# Patient Record
Sex: MALE | Race: OTHER | Employment: OTHER | URBAN - METROPOLITAN AREA
[De-identification: names, ages, dates, MRNs, and addresses within clinical notes are randomized per-mention and may not be internally consistent; named-entity substitution may affect disease eponyms.]

---

## 2018-10-17 ENCOUNTER — OFFICE VISIT (OUTPATIENT)
Dept: FAMILY MEDICINE CLINIC | Facility: CLINIC | Age: 73
End: 2018-10-17
Payer: MEDICARE

## 2018-10-17 ENCOUNTER — TELEPHONE (OUTPATIENT)
Dept: FAMILY MEDICINE CLINIC | Facility: CLINIC | Age: 73
End: 2018-10-17

## 2018-10-17 VITALS
WEIGHT: 154 LBS | SYSTOLIC BLOOD PRESSURE: 165 MMHG | DIASTOLIC BLOOD PRESSURE: 77 MMHG | HEIGHT: 64.5 IN | HEART RATE: 79 BPM | BODY MASS INDEX: 25.97 KG/M2 | TEMPERATURE: 98 F

## 2018-10-17 DIAGNOSIS — K92.1 BLOOD IN THE STOOL: ICD-10-CM

## 2018-10-17 DIAGNOSIS — E11.9 DIABETES MELLITUS TYPE 2 IN NONOBESE (HCC): Primary | ICD-10-CM

## 2018-10-17 DIAGNOSIS — I25.10 CAD IN NATIVE ARTERY: ICD-10-CM

## 2018-10-17 DIAGNOSIS — I10 ESSENTIAL HYPERTENSION: ICD-10-CM

## 2018-10-17 DIAGNOSIS — D50.9 IRON DEFICIENCY ANEMIA, UNSPECIFIED IRON DEFICIENCY ANEMIA TYPE: ICD-10-CM

## 2018-10-17 PROCEDURE — 99203 OFFICE O/P NEW LOW 30 MIN: CPT | Performed by: FAMILY MEDICINE

## 2018-10-17 PROCEDURE — G0463 HOSPITAL OUTPT CLINIC VISIT: HCPCS | Performed by: FAMILY MEDICINE

## 2018-10-17 RX ORDER — CLOPIDOGREL BISULFATE 75 MG/1
75 TABLET ORAL DAILY
COMMUNITY
End: 2018-10-17

## 2018-10-17 RX ORDER — ATORVASTATIN CALCIUM 40 MG/1
40 TABLET, FILM COATED ORAL NIGHTLY
Qty: 90 TABLET | Refills: 0 | Status: SHIPPED | OUTPATIENT
Start: 2018-10-17 | End: 2018-12-10

## 2018-10-17 RX ORDER — CLOPIDOGREL BISULFATE 75 MG/1
75 TABLET ORAL DAILY
Qty: 90 TABLET | Refills: 0 | Status: SHIPPED | OUTPATIENT
Start: 2018-10-17 | End: 2018-12-10

## 2018-10-17 RX ORDER — METOPROLOL SUCCINATE 50 MG/1
50 TABLET, EXTENDED RELEASE ORAL DAILY
Qty: 90 TABLET | Refills: 1 | Status: SHIPPED | OUTPATIENT
Start: 2018-10-17 | End: 2018-12-10

## 2018-10-17 RX ORDER — ATORVASTATIN CALCIUM 40 MG/1
40 TABLET, FILM COATED ORAL NIGHTLY
COMMUNITY
End: 2018-10-17

## 2018-10-17 RX ORDER — OLMESARTAN MEDOXOMIL AND HYDROCHLOROTHIAZIDE 40/12.5 40; 12.5 MG/1; MG/1
1 TABLET ORAL DAILY
COMMUNITY
End: 2018-10-17

## 2018-10-17 RX ORDER — OLMESARTAN MEDOXOMIL AND HYDROCHLOROTHIAZIDE 40/12.5 40; 12.5 MG/1; MG/1
1 TABLET ORAL DAILY
Qty: 90 TABLET | Refills: 1 | Status: SHIPPED | OUTPATIENT
Start: 2018-10-17 | End: 2019-01-21

## 2018-10-17 RX ORDER — METOPROLOL SUCCINATE 50 MG/1
50 TABLET, EXTENDED RELEASE ORAL DAILY
COMMUNITY
End: 2018-10-17

## 2018-10-19 ENCOUNTER — LAB ENCOUNTER (OUTPATIENT)
Dept: LAB | Age: 73
End: 2018-10-19
Attending: FAMILY MEDICINE
Payer: MEDICARE

## 2018-10-19 DIAGNOSIS — E11.9 DIABETES MELLITUS TYPE 2 IN NONOBESE (HCC): ICD-10-CM

## 2018-10-19 PROCEDURE — 83036 HEMOGLOBIN GLYCOSYLATED A1C: CPT

## 2018-10-19 PROCEDURE — 80061 LIPID PANEL: CPT

## 2018-10-19 PROCEDURE — 80053 COMPREHEN METABOLIC PANEL: CPT

## 2018-10-19 PROCEDURE — 84443 ASSAY THYROID STIM HORMONE: CPT

## 2018-10-19 PROCEDURE — 85025 COMPLETE CBC W/AUTO DIFF WBC: CPT

## 2018-10-19 PROCEDURE — 36415 COLL VENOUS BLD VENIPUNCTURE: CPT

## 2018-10-23 NOTE — PROGRESS NOTES
HPI:    Patient ID: Ama Velasco is a 68year old male. Patient here first time. Has DM, history of stent placement and pacemaker and hypertension. Overall doing well. Review of Systems   Constitutional: Negative.   Negative for activity change, diagnosis)  Cad in native artery  Needs blood test, referral to cardiology. Medications refilled.  Will f/u after the results  Orders Placed This Encounter      Comp Metabolic Panel (14) [E]      Hemoglobin A1C [E]      Lipid Panel [E]      TSH [E]      CBC

## 2018-11-13 ENCOUNTER — NURSE ONLY (OUTPATIENT)
Dept: FAMILY MEDICINE CLINIC | Facility: CLINIC | Age: 73
End: 2018-11-13
Payer: MEDICARE

## 2018-11-13 DIAGNOSIS — Z23 PNEUMOCOCCAL VACCINE ADMINISTERED: Primary | ICD-10-CM

## 2018-11-13 PROCEDURE — G0009 ADMIN PNEUMOCOCCAL VACCINE: HCPCS | Performed by: FAMILY MEDICINE

## 2018-11-13 PROCEDURE — 90732 PPSV23 VACC 2 YRS+ SUBQ/IM: CPT | Performed by: FAMILY MEDICINE

## 2018-12-07 ENCOUNTER — TELEPHONE (OUTPATIENT)
Dept: FAMILY MEDICINE CLINIC | Facility: CLINIC | Age: 73
End: 2018-12-07

## 2018-12-07 NOTE — TELEPHONE ENCOUNTER
See message for details from Dr. Sarina Finnegan. .  Pt call message left    Patient to repeat test in 3 months ( ordered) and f/u.  Until then more exercise and diet

## 2018-12-10 ENCOUNTER — OFFICE VISIT (OUTPATIENT)
Dept: CARDIOLOGY CLINIC | Facility: CLINIC | Age: 73
End: 2018-12-10
Payer: MEDICARE

## 2018-12-10 ENCOUNTER — TELEPHONE (OUTPATIENT)
Dept: CARDIOLOGY CLINIC | Facility: CLINIC | Age: 73
End: 2018-12-10

## 2018-12-10 VITALS
HEIGHT: 66 IN | SYSTOLIC BLOOD PRESSURE: 146 MMHG | WEIGHT: 155 LBS | RESPIRATION RATE: 16 BRPM | BODY MASS INDEX: 24.91 KG/M2 | DIASTOLIC BLOOD PRESSURE: 82 MMHG | HEART RATE: 80 BPM

## 2018-12-10 DIAGNOSIS — I25.10 CORONARY ARTERY DISEASE INVOLVING NATIVE CORONARY ARTERY OF NATIVE HEART WITHOUT ANGINA PECTORIS: Primary | ICD-10-CM

## 2018-12-10 DIAGNOSIS — E78.00 PURE HYPERCHOLESTEROLEMIA: ICD-10-CM

## 2018-12-10 PROCEDURE — G0463 HOSPITAL OUTPT CLINIC VISIT: HCPCS | Performed by: INTERNAL MEDICINE

## 2018-12-10 PROCEDURE — 99204 OFFICE O/P NEW MOD 45 MIN: CPT | Performed by: INTERNAL MEDICINE

## 2018-12-10 PROCEDURE — 93010 ELECTROCARDIOGRAM REPORT: CPT | Performed by: INTERNAL MEDICINE

## 2018-12-10 PROCEDURE — 93005 ELECTROCARDIOGRAM TRACING: CPT | Performed by: INTERNAL MEDICINE

## 2018-12-10 RX ORDER — ATORVASTATIN CALCIUM 40 MG/1
40 TABLET, FILM COATED ORAL NIGHTLY
Qty: 90 TABLET | Refills: 3 | Status: CANCELLED | OUTPATIENT
Start: 2018-12-10

## 2018-12-10 RX ORDER — METOPROLOL SUCCINATE 50 MG/1
50 TABLET, EXTENDED RELEASE ORAL DAILY
Qty: 90 TABLET | Refills: 1 | Status: SHIPPED | OUTPATIENT
Start: 2018-12-10 | End: 2019-01-21

## 2018-12-10 RX ORDER — CLOPIDOGREL BISULFATE 75 MG/1
75 TABLET ORAL DAILY
Qty: 90 TABLET | Refills: 3 | Status: SHIPPED | OUTPATIENT
Start: 2018-12-10 | End: 2019-09-23

## 2018-12-10 RX ORDER — ATORVASTATIN CALCIUM 40 MG/1
40 TABLET, FILM COATED ORAL NIGHTLY
Qty: 90 TABLET | Refills: 0 | Status: SHIPPED | OUTPATIENT
Start: 2018-12-10 | End: 2019-09-23

## 2018-12-10 RX ORDER — METOPROLOL SUCCINATE 50 MG/1
50 TABLET, EXTENDED RELEASE ORAL DAILY
Qty: 90 TABLET | Refills: 3 | Status: CANCELLED | OUTPATIENT
Start: 2018-12-10

## 2018-12-10 NOTE — PATIENT INSTRUCTIONS
Schedule an appointment with our pacemaker nurse, continue current medications, see me back in 6 months or as needed

## 2018-12-10 NOTE — TELEPHONE ENCOUNTER
Re: metoprolol and Atorvastatin LOV reviewed. No change in this medication. Meets refill protocol criteria. Refill sent.   Cholesterol Medications  Protocol Criteria:  · Appointment scheduled with Cardiology in the past 12 months or in the next 3 months  · Department Appt Notes    In 1 week [facility] Cardiology         Lab Results   Component Value Date     (H) 10/19/2018    BUN 29 (H) 10/19/2018    CREATSERUM 1.28 10/19/2018    BUNCREA 22.7 (H) 10/19/2018    GFR

## 2018-12-10 NOTE — PROGRESS NOTES
Marcy Mathur is a 68year old male. HPI:   This is a pleasant 51-year-old man who in 2014 had chest discomfort and had a stent placed at [address] year when visiting in [facility] he had an episode of syncope.   He was taken to a hospital there we rhythm, normal heart sounds and intact distal pulses. Pulmonary/Chest: Effort normal and breath sounds normal.   Abdominal: Soft. Bowel sounds are normal.   Neurological: He is alert and oriented to person, place, and time. He has normal reflexes.    Skin

## 2018-12-20 ENCOUNTER — NURSE ONLY (OUTPATIENT)
Dept: CARDIOLOGY CLINIC | Facility: CLINIC | Age: 73
End: 2018-12-20
Payer: MEDICARE

## 2018-12-20 PROCEDURE — 93280 PM DEVICE PROGR EVAL DUAL: CPT | Performed by: INTERNAL MEDICINE

## 2018-12-20 NOTE — PROGRESS NOTES
Pt. Referred to Pacemaker clinic by Dr. Virgene Hashimoto- saw recently. Pt. Had MI while vacationing in [facility] 11/2017 and got a pacemaker after cardiac stent placed. Interrogation shows [employer] pacemaker implanted 11/17/2017.     Native rhythm is SR    D

## 2019-01-14 ENCOUNTER — TELEPHONE (OUTPATIENT)
Dept: FAMILY MEDICINE CLINIC | Facility: CLINIC | Age: 74
End: 2019-01-14

## 2019-01-14 NOTE — TELEPHONE ENCOUNTER
Patient is going out of town this weeks and he would like a refill on medication. Please call patient when refill is done or with any other questions. •  Metoprolol Succinate ER 50 MG Oral Tablet 24 Hr, Take 1 tablet (50 mg total) by mouth daily. , Disp:

## 2019-01-21 RX ORDER — OLMESARTAN MEDOXOMIL AND HYDROCHLOROTHIAZIDE 40/12.5 40; 12.5 MG/1; MG/1
1 TABLET ORAL DAILY
Qty: 90 TABLET | Refills: 0 | Status: SHIPPED | OUTPATIENT
Start: 2019-01-21 | End: 2019-09-24

## 2019-01-21 RX ORDER — METOPROLOL SUCCINATE 50 MG/1
50 TABLET, EXTENDED RELEASE ORAL DAILY
Qty: 90 TABLET | Refills: 0 | Status: SHIPPED | OUTPATIENT
Start: 2019-01-21 | End: 2019-09-23

## 2019-01-22 NOTE — TELEPHONE ENCOUNTER
MD Zaire Holden 6 days ago      Its ok with me. pleae refill    Routing Comment      Rx sent as directed

## 2019-09-23 ENCOUNTER — TELEPHONE (OUTPATIENT)
Dept: CARDIOLOGY CLINIC | Facility: CLINIC | Age: 74
End: 2019-09-23

## 2019-09-23 RX ORDER — METOPROLOL SUCCINATE 50 MG/1
50 TABLET, EXTENDED RELEASE ORAL DAILY
Qty: 90 TABLET | Refills: 1 | Status: SHIPPED | OUTPATIENT
Start: 2019-09-23 | End: 2019-11-18

## 2019-09-23 RX ORDER — ATORVASTATIN CALCIUM 40 MG/1
40 TABLET, FILM COATED ORAL NIGHTLY
Qty: 90 TABLET | Refills: 1 | Status: SHIPPED | OUTPATIENT
Start: 2019-09-23 | End: 2020-01-18

## 2019-09-23 RX ORDER — CLOPIDOGREL BISULFATE 75 MG/1
75 TABLET ORAL DAILY
Qty: 90 TABLET | Refills: 1 | Status: SHIPPED | OUTPATIENT
Start: 2019-09-23 | End: 2020-05-27

## 2019-09-23 NOTE — TELEPHONE ENCOUNTER
Refill protocol criteria met, rx sent.       Hypertensive Medications  Protocol Criteria:  · Appointment scheduled with Cardiology in the past 12 months or in the next 3 months  · BMP or CMP in the past 12 months  · Potassium: 3.1 - 4.9 mmol/L  · Creatinine U/L  Recent Outpatient Visits            9 months ago     [facility]    Nurse Only    9 months ago Coronary artery disease involving native coronary artery of native heart without angina pectoris    [address] f/u        Lab Results   Component Value Date    HGB 13.8 10/19/2018     10/19/2018

## 2019-09-23 NOTE — TELEPHONE ENCOUNTER
Metformin HCL 1,000mf tabs; Olmesartan Medoxomil-HCTZ 40-12.5mg tab    Current Outpatient Medications:   •  Olmesartan Medoxomil-HCTZ 40-12.5 MG Oral Tab, Take 1 tablet by mouth daily. , Disp: 90 tablet, Rfl: 0  •  MetFORMIN HCl 1000 MG Oral Tab, Take 1 tab

## 2019-09-23 NOTE — TELEPHONE ENCOUNTER
Clopidogrel 75mg tabs; Atorvastatin 40mg tabs; Metoprolol Succinate ER 24hr 50mg tabs    Current Outpatient Medications:   •  Metoprolol Succinate ER 50 MG Oral Tablet 24 Hr, Take 1 tablet (50 mg total) by mouth daily. , Disp: 90 tablet, Rfl: 0  •  Clopidog

## 2019-09-24 RX ORDER — OLMESARTAN MEDOXOMIL AND HYDROCHLOROTHIAZIDE 40/12.5 40; 12.5 MG/1; MG/1
1 TABLET ORAL DAILY
Qty: 90 TABLET | Refills: 1 | Status: SHIPPED | OUTPATIENT
Start: 2019-09-24 | End: 2020-01-17

## 2019-09-24 NOTE — TELEPHONE ENCOUNTER
Refill passed per [facility] protocol.     Hypertensive Medications  Protocol Criteria:  · Appointment scheduled in the past 6 months or in the next 3 months  · BMP or CMP in the past 12 months  · Creatinine result < 2  Recent Outpatient Visits

## 2019-09-24 NOTE — TELEPHONE ENCOUNTER
Please review; protocol failed. Requested Prescriptions     Pending Prescriptions Disp Refills   • metFORMIN HCl 1000 MG Oral Tab 1801 tablet 1     Sig: Take 1 tablet (1,000 mg total) by mouth 2 (two) times daily with meals.      Signed Prescriptions Dis

## 2019-10-18 ENCOUNTER — OFFICE VISIT (OUTPATIENT)
Dept: FAMILY MEDICINE CLINIC | Facility: CLINIC | Age: 74
End: 2019-10-18
Payer: MEDICARE

## 2019-10-18 VITALS
HEIGHT: 65 IN | HEART RATE: 96 BPM | WEIGHT: 147 LBS | SYSTOLIC BLOOD PRESSURE: 139 MMHG | DIASTOLIC BLOOD PRESSURE: 84 MMHG | BODY MASS INDEX: 24.49 KG/M2 | TEMPERATURE: 98 F

## 2019-10-18 DIAGNOSIS — I51.9 HEART DISEASE: ICD-10-CM

## 2019-10-18 DIAGNOSIS — E11.9 DIABETES MELLITUS TYPE 2 IN NONOBESE (HCC): Primary | ICD-10-CM

## 2019-10-18 DIAGNOSIS — K92.1 BLOOD IN THE STOOL: ICD-10-CM

## 2019-10-18 PROCEDURE — 99214 OFFICE O/P EST MOD 30 MIN: CPT | Performed by: FAMILY MEDICINE

## 2019-10-18 PROCEDURE — G0463 HOSPITAL OUTPT CLINIC VISIT: HCPCS | Performed by: FAMILY MEDICINE

## 2019-10-18 RX ORDER — HYDROCHLOROTHIAZIDE 12.5 MG/1
12.5 TABLET ORAL DAILY
COMMUNITY
End: 2020-01-16

## 2019-10-23 ENCOUNTER — IMMUNIZATION (OUTPATIENT)
Dept: FAMILY MEDICINE CLINIC | Facility: CLINIC | Age: 74
End: 2019-10-23
Payer: MEDICARE

## 2019-10-23 ENCOUNTER — LAB ENCOUNTER (OUTPATIENT)
Dept: LAB | Age: 74
End: 2019-10-23
Attending: FAMILY MEDICINE
Payer: MEDICARE

## 2019-10-23 DIAGNOSIS — K92.1 BLOOD IN THE STOOL: ICD-10-CM

## 2019-10-23 DIAGNOSIS — Z23 NEED FOR VACCINATION: ICD-10-CM

## 2019-10-23 DIAGNOSIS — E11.9 DIABETES MELLITUS TYPE 2 IN NONOBESE (HCC): ICD-10-CM

## 2019-10-23 PROCEDURE — 36415 COLL VENOUS BLD VENIPUNCTURE: CPT

## 2019-10-23 PROCEDURE — 84443 ASSAY THYROID STIM HORMONE: CPT

## 2019-10-23 PROCEDURE — 80061 LIPID PANEL: CPT

## 2019-10-23 PROCEDURE — 80053 COMPREHEN METABOLIC PANEL: CPT

## 2019-10-23 PROCEDURE — 90662 IIV NO PRSV INCREASED AG IM: CPT | Performed by: FAMILY MEDICINE

## 2019-10-23 PROCEDURE — 83036 HEMOGLOBIN GLYCOSYLATED A1C: CPT

## 2019-10-23 PROCEDURE — G0008 ADMIN INFLUENZA VIRUS VAC: HCPCS | Performed by: FAMILY MEDICINE

## 2019-10-23 PROCEDURE — 85025 COMPLETE CBC W/AUTO DIFF WBC: CPT

## 2019-10-23 PROCEDURE — 82274 ASSAY TEST FOR BLOOD FECAL: CPT

## 2019-10-24 RX ORDER — FERROUS SULFATE 325(65) MG
325 TABLET ORAL
Qty: 90 TABLET | Refills: 1 | Status: SHIPPED | OUTPATIENT
Start: 2019-10-24 | End: 2019-10-25

## 2019-10-25 ENCOUNTER — TELEPHONE (OUTPATIENT)
Dept: FAMILY MEDICINE CLINIC | Facility: CLINIC | Age: 74
End: 2019-10-25

## 2019-10-25 RX ORDER — FERROUS SULFATE 325(65) MG
325 TABLET ORAL
Qty: 90 TABLET | Refills: 1 | Status: SHIPPED | OUTPATIENT
Start: 2019-10-25 | End: 2020-01-17

## 2019-10-25 NOTE — TELEPHONE ENCOUNTER
Patient with daughter requesting to change [facility] from [facility] to [place]. Pharmacy information changed in patient's chart. Resent Iron prescription to [facility] in [place].

## 2019-10-28 ENCOUNTER — NURSE TRIAGE (OUTPATIENT)
Dept: FAMILY MEDICINE CLINIC | Facility: CLINIC | Age: 74
End: 2019-10-28

## 2019-10-28 ENCOUNTER — OFFICE VISIT (OUTPATIENT)
Dept: FAMILY MEDICINE CLINIC | Facility: CLINIC | Age: 74
End: 2019-10-28
Payer: MEDICARE

## 2019-10-28 VITALS
HEIGHT: 65 IN | HEART RATE: 83 BPM | DIASTOLIC BLOOD PRESSURE: 72 MMHG | TEMPERATURE: 97 F | BODY MASS INDEX: 24.49 KG/M2 | SYSTOLIC BLOOD PRESSURE: 134 MMHG | WEIGHT: 147 LBS

## 2019-10-28 DIAGNOSIS — R19.5 DARK STOOLS: Primary | ICD-10-CM

## 2019-10-28 PROCEDURE — G0463 HOSPITAL OUTPT CLINIC VISIT: HCPCS | Performed by: PHYSICIAN ASSISTANT

## 2019-10-28 PROCEDURE — 99213 OFFICE O/P EST LOW 20 MIN: CPT | Performed by: PHYSICIAN ASSISTANT

## 2019-10-28 NOTE — PROGRESS NOTES
HPI:    Patient ID: Lilia Rainey is a 76year old male. HPI    Review of Systems   Constitutional: Negative. Negative for activity change, diaphoresis, fatigue and fever. HENT: Negative. Respiratory: Negative.   Negative for cough and shortness of Metabolic Panel (14) [E]      Hemoglobin A1C [E]      Lipid Panel [E]      TSH [E]      CBC W Differential W Platelet [E]      Occult Blood, Fecal, Immunoassay [E]      Meds This Visit:  Requested Prescriptions      No prescriptions requested or ordered in

## 2019-10-28 NOTE — PROGRESS NOTES
HPI:    Patient ID: Isaac Batista is a 76year old male. F/u hypertension and diabetes . Due for blood test.   Also had some small amount of blood in the stool. 'no abdominal pain or stomach pain.    Otherwise well      Review of Systems   Constitutional: distension. There is no tenderness.               ASSESSMENT/PLAN:   Diabetes mellitus type 2 in nonobese (hcc)  (primary encounter diagnosis)  Heart disease  Blood in the stool    Orders Placed This Encounter      Comp Metabolic Panel (14) [E]      Hemoglo

## 2019-10-28 NOTE — PROGRESS NOTES
HPI:    Patient ID: Alana Garza is a 76year old male. Patient presents for dark stools. He was told from 10/23/19 that he has blood in his stool. Patient has started taking iron pills for his anemia from PCP.  He started noticing that his stools got da sounds are normal. He exhibits no distension. There is no tenderness. There is no rebound and no guarding. No hernia. Neurological: He is alert and oriented to person, place, and time. Skin: Skin is warm and dry. ASSESSMENT/PLAN:   1.  Dark

## 2019-10-28 NOTE — TELEPHONE ENCOUNTER
Pt. States that he is very concerned about seeing alot more blood in his stool. Pt. Wants to know if he needs to be seen or go to ER Dept. ?

## 2019-11-13 NOTE — H&P
[address] [facility] [address] Gastroenterology                                                                                                  Clinic History and Physical     No PACEMAKER MONITOR        Family Hx:   Family History   Problem Relation Age of Onset   • Diabetes Sister       Social History: Social History    Tobacco Use      Smoking status: Former Smoker        Packs/day: 0.00      Smokeless tobacco: Never Used    Alc anicteric, oropharynx clear, mucus membranes appear moist  CV: regular rate and rhythm, the extremities are warm and well perfused   Lung: effort normal and breath sounds normal, no respiratory distress, wheezes or rales  GI: soft, non-tender exam in all q prescribed      Colonoscopy consent: I have discussed the risks (including risk of delayed/missed diagnosis), benefits, and alternatives to colonoscopy with the patient [who demonstrated understanding], including but not limited to the risks of bleeding, i

## 2019-11-18 ENCOUNTER — OFFICE VISIT (OUTPATIENT)
Dept: CARDIOLOGY CLINIC | Facility: CLINIC | Age: 74
End: 2019-11-18
Payer: MEDICARE

## 2019-11-18 VITALS
SYSTOLIC BLOOD PRESSURE: 142 MMHG | BODY MASS INDEX: 24.49 KG/M2 | HEIGHT: 65 IN | WEIGHT: 147 LBS | HEART RATE: 89 BPM | TEMPERATURE: 98 F | DIASTOLIC BLOOD PRESSURE: 79 MMHG | RESPIRATION RATE: 19 BRPM

## 2019-11-18 DIAGNOSIS — I10 ESSENTIAL HYPERTENSION: ICD-10-CM

## 2019-11-18 DIAGNOSIS — I25.10 CORONARY ARTERY DISEASE INVOLVING NATIVE CORONARY ARTERY OF NATIVE HEART WITHOUT ANGINA PECTORIS: Primary | ICD-10-CM

## 2019-11-18 PROCEDURE — G0463 HOSPITAL OUTPT CLINIC VISIT: HCPCS | Performed by: INTERNAL MEDICINE

## 2019-11-18 PROCEDURE — 99214 OFFICE O/P EST MOD 30 MIN: CPT | Performed by: INTERNAL MEDICINE

## 2019-11-18 RX ORDER — METOPROLOL SUCCINATE 100 MG/1
100 TABLET, EXTENDED RELEASE ORAL DAILY
Qty: 90 TABLET | Refills: 3 | Status: SHIPPED | OUTPATIENT
Start: 2019-11-18 | End: 2020-01-18

## 2019-11-18 NOTE — PATIENT INSTRUCTIONS
Increase metoprolol XL dose to 100 mg/day. New prescription was sent.   See nurse practitioner Bucky Massey in 1 month for blood pressure evaluation

## 2019-11-19 ENCOUNTER — OFFICE VISIT (OUTPATIENT)
Dept: FAMILY MEDICINE CLINIC | Facility: CLINIC | Age: 74
End: 2019-11-19
Payer: MEDICARE

## 2019-11-19 VITALS
SYSTOLIC BLOOD PRESSURE: 141 MMHG | DIASTOLIC BLOOD PRESSURE: 67 MMHG | TEMPERATURE: 98 F | HEART RATE: 84 BPM | HEIGHT: 65 IN | BODY MASS INDEX: 24.49 KG/M2 | WEIGHT: 147 LBS

## 2019-11-19 DIAGNOSIS — E11.9 DIABETES MELLITUS TYPE 2 IN NONOBESE (HCC): Primary | ICD-10-CM

## 2019-11-19 DIAGNOSIS — I10 ESSENTIAL HYPERTENSION: ICD-10-CM

## 2019-11-19 PROCEDURE — 99213 OFFICE O/P EST LOW 20 MIN: CPT | Performed by: FAMILY MEDICINE

## 2019-11-19 PROCEDURE — 90670 PCV13 VACCINE IM: CPT | Performed by: FAMILY MEDICINE

## 2019-11-19 PROCEDURE — G0009 ADMIN PNEUMOCOCCAL VACCINE: HCPCS | Performed by: FAMILY MEDICINE

## 2019-11-19 PROCEDURE — G0463 HOSPITAL OUTPT CLINIC VISIT: HCPCS | Performed by: FAMILY MEDICINE

## 2019-11-19 NOTE — PROGRESS NOTES
HPI:    Patient ID: Suzan Chamorro is a 76year old male. Patient here for f/u diabetes and hypertension. Overall doing well. Diabetes could be better controlled. Review of Systems   Constitutional: Negative.   Negative for activity change, diaphor [E]      Meds This Visit:  Requested Prescriptions      No prescriptions requested or ordered in this encounter       Imaging & Referrals:  None       XP#[id number]

## 2019-11-20 ENCOUNTER — LAB ENCOUNTER (OUTPATIENT)
Dept: LAB | Facility: HOSPITAL | Age: 74
End: 2019-11-20
Attending: FAMILY MEDICINE
Payer: MEDICARE

## 2019-11-20 ENCOUNTER — TELEPHONE (OUTPATIENT)
Dept: GASTROENTEROLOGY | Facility: CLINIC | Age: 74
End: 2019-11-20

## 2019-11-20 ENCOUNTER — OFFICE VISIT (OUTPATIENT)
Dept: GASTROENTEROLOGY | Facility: CLINIC | Age: 74
End: 2019-11-20
Payer: MEDICARE

## 2019-11-20 VITALS
SYSTOLIC BLOOD PRESSURE: 133 MMHG | HEIGHT: 65 IN | HEART RATE: 90 BPM | BODY MASS INDEX: 24.52 KG/M2 | WEIGHT: 147.19 LBS | DIASTOLIC BLOOD PRESSURE: 73 MMHG

## 2019-11-20 DIAGNOSIS — Z86.010 HISTORY OF COLON POLYPS: ICD-10-CM

## 2019-11-20 DIAGNOSIS — D64.9 ANEMIA, UNSPECIFIED TYPE: ICD-10-CM

## 2019-11-20 DIAGNOSIS — Z12.11 SCREENING FOR COLON CANCER: ICD-10-CM

## 2019-11-20 DIAGNOSIS — R19.5 POSITIVE FIT (FECAL IMMUNOCHEMICAL TEST): Primary | ICD-10-CM

## 2019-11-20 DIAGNOSIS — R19.5 POSITIVE FECAL OCCULT BLOOD TEST: ICD-10-CM

## 2019-11-20 DIAGNOSIS — Z12.11 COLON CANCER SCREENING: Primary | ICD-10-CM

## 2019-11-20 PROCEDURE — 84466 ASSAY OF TRANSFERRIN: CPT

## 2019-11-20 PROCEDURE — G0463 HOSPITAL OUTPT CLINIC VISIT: HCPCS | Performed by: NURSE PRACTITIONER

## 2019-11-20 PROCEDURE — 83540 ASSAY OF IRON: CPT

## 2019-11-20 PROCEDURE — 36415 COLL VENOUS BLD VENIPUNCTURE: CPT

## 2019-11-20 PROCEDURE — 82728 ASSAY OF FERRITIN: CPT

## 2019-11-20 PROCEDURE — 99204 OFFICE O/P NEW MOD 45 MIN: CPT | Performed by: NURSE PRACTITIONER

## 2019-11-20 PROCEDURE — 85025 COMPLETE CBC W/AUTO DIFF WBC: CPT

## 2019-11-20 NOTE — TELEPHONE ENCOUNTER
Scheduled for:  Colonoscopy [phone] , [address]  Provider Name:    Date:  12/5/19   Location:  [facility]  Sedation:  Mac  Time:  3454   -------------  Arrive 0930   Prep: Colyte  Meds/Allergies Reconciled?:  Physician reviewed  Diagnosis with codes:  + fit

## 2019-11-20 NOTE — H&P
[address] [facility] [address] Gastroenterology                                                                                                  Clinic History and Physical     Pa unremarkable exam.  On ASA 81 mg and Plavix. Otherwise stable from a cardiac perspective. No chest pain or shortness of breath.         Pertinent Surgical Hx:  No abdominal surgery  - See additional surgical hx below  - No known issues with sedation.  - N 12.5 MG Oral Tab Take 12.5 mg by mouth daily. • metFORMIN HCl 1000 MG Oral Tab Take 1 tablet (1,000 mg total) by mouth 2 (two) times daily with meals. 180 tablet 1   • Olmesartan Medoxomil-HCTZ 40-12.5 MG Oral Tab Take 1 tablet by mouth daily.  90 table vitals reviewed      Labs/Imaging:     Patient's labs and imaging were reviewed and discussed with patient today. See HPI and A&P for further details.       ASSESSMENT/PLAN:   Nora Azul is a 76year old year-old male pt of Dr. Severo Fruit with history of CAD ASA-continue as prescribed, Plavix (Please contact prescribing MD (Dr. Dewayne Puente) for specific recommendations including number of days to be held prior to procedure indicated if appropriate)  -Diabetes meds: Hold metformin/Victoza day before/day of procedure

## 2019-11-20 NOTE — PATIENT INSTRUCTIONS
Recommend:  -Schedule colonoscopy/EGD w/ Dr. Oniel Cuevas, Dr. Anupam Georges, Dr. Smita Ellis with MAC pending availability  Dx: screening, + FIT, hx colon polyps, anemia   -Eligible for NE: No r/t medical history  -Prep: Split dose Colyte or equivalent  -Anti-platelets

## 2019-11-20 NOTE — TELEPHONE ENCOUNTER
Dr. Ahsan Chu,     Patient is scheduled to have a colonoscopy and EGD on 12/05/19. Please advise if patient can hold plavix and for how many days. Thank you.

## 2019-11-20 NOTE — TELEPHONE ENCOUNTER
FYI;Rn's  Please see Documentation per [facility] Notes  Pt is scheduled at [facility] on 12/5/19 for Colon and Egd .     Anti-platelets and anti-coagulants: ASA-continue as prescribed, Plavix (Please contact prescribing MD (Dr. Paty Nelson) for specific recommendations incl

## 2019-11-21 ENCOUNTER — TELEPHONE (OUTPATIENT)
Dept: GASTROENTEROLOGY | Facility: CLINIC | Age: 74
End: 2019-11-21

## 2019-11-21 NOTE — TELEPHONE ENCOUNTER
----- Message from MYRA Kwong sent at 11/21/2019  7:17 AM CST -----  I left the patient a voicemail to call the office regarding his labs. We scheduled bidirectional study due to new onset normocytic anemia during our office visit yesterday.   Re

## 2019-11-21 NOTE — TELEPHONE ENCOUNTER
Spoke to patient he gave be verbal okay to speak to his daughter Regis Schmitt, I informed her that Karolina Diaz the bidirectional study was scheduled due to new onset normocytic anemia during the office visit with [facility].  However repeat labs have showed that his anemi

## 2019-11-25 NOTE — TELEPHONE ENCOUNTER
Attempted to contact patient on cell phone, Yany. Tried home phone, rang thrice and went to a busy line. Pt's procedure is on 12/05/19. Would need to hold plavix starting 11/30/19.

## 2019-11-25 NOTE — TELEPHONE ENCOUNTER
Discussed with Dr. Jeremias Velásquez. Pt may be off plavix for 5 days. Routed to GI clinical staff.

## 2019-11-26 NOTE — TELEPHONE ENCOUNTER
Attempted to reach the patient again and appears the phone number [phone] is his daughter's phone number. Niharika Lainez states she will be at home with the patient in 10 mins and asking if there's anything RN can relay to her.   Niharika Lainez is not on LATASHA and th

## 2019-11-26 NOTE — TELEPHONE ENCOUNTER
Attempted to contact the patient again and was able to speak with him but requested that I give the order to New katerina.       New katerina was instructed to have the patient hold Plavix x5 days prior to CLN and EGD to prevent bleeding if a biopsy needs to be taken

## 2019-11-29 ENCOUNTER — TELEPHONE (OUTPATIENT)
Dept: GASTROENTEROLOGY | Facility: CLINIC | Age: 74
End: 2019-11-29

## 2019-11-29 NOTE — TELEPHONE ENCOUNTER
Spoke with Jeri Adam (patient's daughter, verbal Tammy Poon received from patient) and was wondering about the name of the medication again patient was supposed to hold from a phone call a few days ago. This was previously discussed with Jeri Adam 11/26/19.   Soumya

## 2019-12-05 ENCOUNTER — ANESTHESIA EVENT (OUTPATIENT)
Dept: ENDOSCOPY | Facility: HOSPITAL | Age: 74
End: 2019-12-05
Payer: MEDICARE

## 2019-12-05 ENCOUNTER — ANESTHESIA (OUTPATIENT)
Dept: ENDOSCOPY | Facility: HOSPITAL | Age: 74
End: 2019-12-05
Payer: MEDICARE

## 2019-12-05 ENCOUNTER — HOSPITAL ENCOUNTER (OUTPATIENT)
Facility: HOSPITAL | Age: 74
Setting detail: HOSPITAL OUTPATIENT SURGERY
Discharge: HOME | End: 2019-12-05
Attending: INTERNAL MEDICINE | Admitting: INTERNAL MEDICINE
Payer: MEDICARE

## 2019-12-05 VITALS
BODY MASS INDEX: 23.54 KG/M2 | RESPIRATION RATE: 20 BRPM | OXYGEN SATURATION: 99 % | DIASTOLIC BLOOD PRESSURE: 80 MMHG | SYSTOLIC BLOOD PRESSURE: 117 MMHG | HEART RATE: 89 BPM | WEIGHT: 150 LBS | HEIGHT: 67 IN

## 2019-12-05 DIAGNOSIS — Z12.11 COLON CANCER SCREENING: ICD-10-CM

## 2019-12-05 DIAGNOSIS — K56.699 COLON STRICTURE (HCC): Primary | ICD-10-CM

## 2019-12-05 DIAGNOSIS — D64.9 ANEMIA, UNSPECIFIED TYPE: ICD-10-CM

## 2019-12-05 DIAGNOSIS — K62.1 RECTAL POLYP: ICD-10-CM

## 2019-12-05 DIAGNOSIS — Z86.010 HISTORY OF COLON POLYPS: ICD-10-CM

## 2019-12-05 DIAGNOSIS — K64.8 INTERNAL HEMORRHOIDS WITHOUT COMPLICATION: ICD-10-CM

## 2019-12-05 DIAGNOSIS — R19.5 POSITIVE FECAL OCCULT BLOOD TEST: ICD-10-CM

## 2019-12-05 DIAGNOSIS — K29.70 GASTRITIS: ICD-10-CM

## 2019-12-05 DIAGNOSIS — K25.9 GASTRIC ULCER: ICD-10-CM

## 2019-12-05 DIAGNOSIS — K57.90 DIVERTICULOSIS: ICD-10-CM

## 2019-12-05 PROCEDURE — 0DB68ZX EXCISION OF STOMACH, VIA NATURAL OR ARTIFICIAL OPENING ENDOSCOPIC, DIAGNOSTIC: ICD-10-PCS | Performed by: INTERNAL MEDICINE

## 2019-12-05 PROCEDURE — 0DBL8ZX EXCISION OF TRANSVERSE COLON, VIA NATURAL OR ARTIFICIAL OPENING ENDOSCOPIC, DIAGNOSTIC: ICD-10-PCS | Performed by: INTERNAL MEDICINE

## 2019-12-05 PROCEDURE — 3E0H8GC INTRODUCTION OF OTHER THERAPEUTIC SUBSTANCE INTO LOWER GI, VIA NATURAL OR ARTIFICIAL OPENING ENDOSCOPIC: ICD-10-PCS | Performed by: INTERNAL MEDICINE

## 2019-12-05 PROCEDURE — 43239 EGD BIOPSY SINGLE/MULTIPLE: CPT | Performed by: INTERNAL MEDICINE

## 2019-12-05 PROCEDURE — 0DBP8ZX EXCISION OF RECTUM, VIA NATURAL OR ARTIFICIAL OPENING ENDOSCOPIC, DIAGNOSTIC: ICD-10-PCS | Performed by: INTERNAL MEDICINE

## 2019-12-05 PROCEDURE — 45380 COLONOSCOPY AND BIOPSY: CPT | Performed by: INTERNAL MEDICINE

## 2019-12-05 RX ORDER — SODIUM CHLORIDE, SODIUM LACTATE, POTASSIUM CHLORIDE, CALCIUM CHLORIDE 600; 310; 30; 20 MG/100ML; MG/100ML; MG/100ML; MG/100ML
INJECTION, SOLUTION INTRAVENOUS CONTINUOUS
Status: DISCONTINUED | OUTPATIENT
Start: 2019-12-05 | End: 2019-12-05

## 2019-12-05 RX ORDER — LIDOCAINE HYDROCHLORIDE 10 MG/ML
INJECTION, SOLUTION EPIDURAL; INFILTRATION; INTRACAUDAL; PERINEURAL AS NEEDED
Status: DISCONTINUED | OUTPATIENT
Start: 2019-12-05 | End: 2019-12-05 | Stop reason: SURG

## 2019-12-05 RX ORDER — NALOXONE HYDROCHLORIDE 0.4 MG/ML
80 INJECTION, SOLUTION INTRAMUSCULAR; INTRAVENOUS; SUBCUTANEOUS AS NEEDED
Status: DISCONTINUED | OUTPATIENT
Start: 2019-12-05 | End: 2019-12-05

## 2019-12-05 RX ORDER — DEXTROSE MONOHYDRATE 25 G/50ML
50 INJECTION, SOLUTION INTRAVENOUS
Status: DISCONTINUED | OUTPATIENT
Start: 2019-12-05 | End: 2019-12-05

## 2019-12-05 RX ADMIN — LIDOCAINE HYDROCHLORIDE 25 MG: 10 INJECTION, SOLUTION EPIDURAL; INFILTRATION; INTRACAUDAL; PERINEURAL at 11:03:00

## 2019-12-05 RX ADMIN — SODIUM CHLORIDE, SODIUM LACTATE, POTASSIUM CHLORIDE, CALCIUM CHLORIDE: 600; 310; 30; 20 INJECTION, SOLUTION INTRAVENOUS at 11:39:00

## 2019-12-05 NOTE — OPERATIVE REPORT
[facility] Endoscopy Report      Preoperative Diagnosis:  - FIT positive  - anemia      Postoperative Diagnosis:  - stricture in transverse colon - biopsied and arcelia ink marked  - diverticulosis  - internal hemorrhoids  - colon polyps x 3 approximately 9 to 10 mm however I was unable to maneuver an upper endoscope through this region due to looping. Area was marked with [place] ink and multiple biopsies taken. Concerning for malignancy but not obvious.     Diverticular disease located in the

## 2019-12-05 NOTE — ANESTHESIA PREPROCEDURE EVALUATION
Anesthesia PreOp Note    HPI:     Nneka Mukherjee is a 76year old male who presents for preoperative consultation requested by: Monique Pearson MD    Date of Surgery: 12/5/2019    Procedure(s):  COLONOSCOPY  ESOPHAGOGASTRODUODENOSCOPY (EGD)  Indication: Co , Rfl: , 12/3/2019  Liraglutide (VICTOZA) 18 MG/3ML Subcutaneous Solution Pen-injector, 0.6 mg sq a day for 1 week then 1.2 mg a day, Disp: 3 pen, Rfl: 1, Taking  Ferrous Sulfate (FEOSOL) 325 (65 Fe) MG Oral Tab, Take 1 tablet (325 mg total) by mouth daily Not on file        Forced sexual activity: Not on file    Other Topics      Concerns:        Not on file    Social History Narrative      Not on file      Available pre-op labs reviewed.   Lab Results   Component Value Date    WBC 8.0 11/20/2019    RBC 4.52 the patient's questions were answered to the best of my ability. The patient desires the anesthetic management as planned. Estefanía De Luna  12/5/2019 10:58 AM

## 2019-12-05 NOTE — ANESTHESIA POSTPROCEDURE EVALUATION
Patient: Demetrius Toribio    Procedure Summary     Date:  12/05/19 Room / Location:  [address] [place] [address] ENDOSCOPY 05 / [address] [place] [address] ENDOSCOPY    Anesthesia Start:  6742 Anesthesia Stop:      Procedures:       COLONOSCOPY (N/A )      ESOPHAGOGASTRODUODENOSCOPY (EGD) (N/A ) Diagnosis:

## 2019-12-05 NOTE — H&P
History & Physical Examination    Patient Name: Nora Azul  MRN: [id number]  [facility]: [id number]  YOB: 1945    Diagnosis: positive FIT   anemia      Metoprolol Succinate  MG Oral Tablet 24 Hr, Take 1 tablet (100 mg total) by mouth daily. , Check if Review is Normal Check if Physical Exam is Normal If not normal, please explain:   HEENT [x ] [ x]    NECK & BACK [x ] [x ]    HEART [x ] [ x]    LUNGS [x ] [ x]    ABDOMEN [x ] [x ]    UROGENITAL [ ] [ ]    EXTREMITIES [x ] [x ]    OTHER        [

## 2019-12-09 ENCOUNTER — TELEPHONE (OUTPATIENT)
Dept: GASTROENTEROLOGY | Facility: CLINIC | Age: 74
End: 2019-12-09

## 2019-12-09 DIAGNOSIS — K56.699 COLONIC STRICTURE (HCC): Primary | ICD-10-CM

## 2019-12-09 NOTE — TELEPHONE ENCOUNTER
Nursing: I left a detailed voicemail for the patient to call the office back per pathology results below. May relay message below when he calls back.   I would also be happy to discuss with the patient if there are questions/concerns outside the scope of n

## 2019-12-09 NOTE — TELEPHONE ENCOUNTER
Dr. Omar Darden,    Patient's pathology results demonstrate hyperplastic colon polyps x3, colonic mucosa at the site of stricture show regenerative changes/extensive granulation tissue with acute/chronic inflammation but no malignancy.   Stomach/gastric ulcer bio

## 2019-12-10 RX ORDER — OMEPRAZOLE 40 MG/1
40 CAPSULE, DELAYED RELEASE ORAL DAILY
Qty: 30 CAPSULE | Refills: 3 | Status: SHIPPED | OUTPATIENT
Start: 2019-12-10 | End: 2019-12-11 | Stop reason: ALTCHOICE

## 2019-12-10 NOTE — TELEPHONE ENCOUNTER
We have no LATASHA to speak to Vidya--only pt or spouse Fritz Enrique. Javad Pineda states pt stepped out, but she will have him call back while she is there to relay messages--we will need verbal consent from pt.

## 2019-12-10 NOTE — TELEPHONE ENCOUNTER
Noted and appreciated. 1.  The patient does not need to continue on iron as his labs demonstrated resolution of anemia and no iron deficiency  2. Rx for omeprazole 40 mg daily sent to pharmacy  3. I am not certain on CLN/EGD recalls.   This may depend

## 2019-12-10 NOTE — TELEPHONE ENCOUNTER
Daughter Tonya Navas transferred from phone room. Reviewed all below. She scheduled pt for CT scan this Fri Dec 13. Informed after results are in, Dr will advise on recall orders for egd/colon recalls.  Instructed to take the Omeprazole until we find out about

## 2019-12-10 NOTE — TELEPHONE ENCOUNTER
Joanie MISTRY--Pt and daughter transferred from phone room. Obtained consent to speak to [place]. Reviewed results below. Pt does take Plavix, but confirms that no other NSAIDS other than Aspirin 81 mg.  Has Medicare only, so no prior auth needed for CT scan--

## 2019-12-10 NOTE — TELEPHONE ENCOUNTER
Left message on cell voicemail to call back. Per below, pt gave me phone consent to speak to daughter Davion Oliveira. I have also mailed an LATASHA verbal release form for pt to mail back. Attempted home phone but rings and goes to busy.

## 2019-12-11 ENCOUNTER — TELEPHONE (OUTPATIENT)
Dept: GASTROENTEROLOGY | Facility: CLINIC | Age: 74
End: 2019-12-11

## 2019-12-11 RX ORDER — PANTOPRAZOLE SODIUM 40 MG/1
40 TABLET, DELAYED RELEASE ORAL
Qty: 30 TABLET | Refills: 3 | Status: SHIPPED | OUTPATIENT
Start: 2019-12-11 | End: 2020-01-10

## 2019-12-11 NOTE — TELEPHONE ENCOUNTER
Called pharmacy back spoke to pharmacist Mary Ellen Flores and asked what interaction is showing up she states omeprazole decreases Plavix effectiveness. A possible alternative would be pantoprazole 20 mg or 40 mg.      Called pharmacy back spoke to Lillie ensured that

## 2019-12-11 NOTE — TELEPHONE ENCOUNTER
Nursing: Can we clarify with the pharmacy to see what sort of interaction? I did not receive an alert from [facility] regarding medication interaction.  The patient was diagnosed with a gastric ulcer so he will need to be on an acid suppression, at least tempora

## 2019-12-11 NOTE — TELEPHONE ENCOUNTER
[address]- Please advise [facility] stating drug interaction with below prescribed mediations.  Please advise if pt should continue PPI if not please advise on alternative

## 2019-12-13 ENCOUNTER — HOSPITAL ENCOUNTER (OUTPATIENT)
Dept: CT IMAGING | Facility: HOSPITAL | Age: 74
Discharge: HOME | End: 2019-12-13
Attending: NURSE PRACTITIONER
Payer: MEDICARE

## 2019-12-13 DIAGNOSIS — K56.699 COLONIC STRICTURE (HCC): ICD-10-CM

## 2019-12-13 PROCEDURE — 74177 CT ABD & PELVIS W/CONTRAST: CPT | Performed by: NURSE PRACTITIONER

## 2019-12-13 PROCEDURE — 82565 ASSAY OF CREATININE: CPT

## 2019-12-18 ENCOUNTER — OFFICE VISIT (OUTPATIENT)
Dept: CARDIOLOGY CLINIC | Facility: CLINIC | Age: 74
End: 2019-12-18
Payer: MEDICARE

## 2019-12-18 ENCOUNTER — TELEPHONE (OUTPATIENT)
Dept: CARDIOLOGY CLINIC | Facility: CLINIC | Age: 74
End: 2019-12-18

## 2019-12-18 VITALS
SYSTOLIC BLOOD PRESSURE: 129 MMHG | WEIGHT: 151 LBS | BODY MASS INDEX: 23.7 KG/M2 | HEIGHT: 67 IN | HEART RATE: 80 BPM | RESPIRATION RATE: 18 BRPM | DIASTOLIC BLOOD PRESSURE: 77 MMHG

## 2019-12-18 DIAGNOSIS — R01.1 MURMUR: Primary | ICD-10-CM

## 2019-12-18 PROCEDURE — G0463 HOSPITAL OUTPT CLINIC VISIT: HCPCS | Performed by: NURSE PRACTITIONER

## 2019-12-18 PROCEDURE — 99214 OFFICE O/P EST MOD 30 MIN: CPT | Performed by: NURSE PRACTITIONER

## 2019-12-18 NOTE — TELEPHONE ENCOUNTER
Lottie Salgado is here to see cardiologist and has a question for you. He has made an appointment with you. His concern is that he when he discontinued taking his iron tablet, he had severe diarrhea so he went back on the iron med.   Silverio

## 2019-12-18 NOTE — PROGRESS NOTES
Lucretia Marin is a 76year old male. Patient presents with: Follow - Up  CAD  Hypertension  Dizziness: positional    HPI:   Patient comes in today for follow-up. He sees Dr. Joi Dumont.   He has a history of coronary disease with latest stent in [facility] 2 years ago (75 mg total) by mouth daily. 90 tablet 1   • BABY ASPIRIN OR Take 81 mg by mouth.           Past Medical History:   Diagnosis Date   • Anemia    • CAD (coronary artery disease)    • Diabetes ([facility] [address]    • Essential hypertension    • Hyperlipidemia    • Pacema agrees to the plan. The patient is asked to return in 1 year.       MYRA Mead  12/18/2019  10:49 AM

## 2019-12-18 NOTE — PATIENT INSTRUCTIONS
1. See pacer nurse soon  2. Continue same medications   3. Continue to check BP at home  4. Echocardiogram  5.  See Dr. Charlotte Tello next year in the Fall

## 2019-12-19 ENCOUNTER — TELEPHONE (OUTPATIENT)
Dept: GASTROENTEROLOGY | Facility: CLINIC | Age: 74
End: 2019-12-19

## 2019-12-19 DIAGNOSIS — Z12.11 COLON CANCER SCREENING: ICD-10-CM

## 2019-12-19 DIAGNOSIS — K56.699 COLONIC STRICTURE (HCC): ICD-10-CM

## 2019-12-19 DIAGNOSIS — R19.4 ALTERED BOWEL HABITS: Primary | ICD-10-CM

## 2019-12-19 DIAGNOSIS — Z86.010 PERSONAL HISTORY OF COLONIC POLYPS: ICD-10-CM

## 2019-12-19 NOTE — TELEPHONE ENCOUNTER
CBLM to schedule procedure. Please transfer to Poornima Pink at ext [phone] or [phone] for scheduling.

## 2019-12-19 NOTE — TELEPHONE ENCOUNTER
I spoke w/ pt's daughter Isi Bradley) - pt gave verbal consent over the phone (> 10 minutes). I relayed Dr. Anabella Guevara message from result note on CT scan dated 12/19/19. Verbalized understanding. Will schedule for repeat CLN in 4 months as orders below.     Pt's contact prescribing MD for specific recommendations including number of days to be held prior to procedure indicated if appropriate)  -Diabetes meds: Hold Metformin/Victoza day before/day of procedure  -Hold iron 5 days prior (if still taking)    ** If MAC

## 2019-12-19 NOTE — TELEPHONE ENCOUNTER
GI RN's - Please advise on patients blood thinner medication. Patient sees Dr Tamiko Vicente.     Thank you

## 2019-12-19 NOTE — TELEPHONE ENCOUNTER
Scheduled for:  COLON [id number]   Provider Name: Dr Tony Hooker  Date: 05/01/2020   Location:  [facility]  Sedation:  MAC  Time:  9:45AM (pt is aware to arrive at 8:45AM)  Prep: COLYTE Prep instructions were given to pt's daughter over the phone, pt verbalized understandin

## 2019-12-19 NOTE — TELEPHONE ENCOUNTER
He does not need iron as per note of aprn. The questions is why he continues with diarrhea.  That he needs to address with GI

## 2019-12-20 NOTE — TELEPHONE ENCOUNTER
Spoke with patient daughter Terra Conley, informed of Dr. Kellie Chapman message below    States have spoken  with GI , picked up specimen cup today  for stool testing : Ova & Parasites, C Diif and stool culture      Routing to DR. Connors as [facility]

## 2019-12-22 ENCOUNTER — LAB ENCOUNTER (OUTPATIENT)
Dept: LAB | Age: 74
End: 2019-12-22
Attending: NURSE PRACTITIONER
Payer: MEDICARE

## 2019-12-22 DIAGNOSIS — R19.4 ALTERED BOWEL HABITS: ICD-10-CM

## 2019-12-22 PROCEDURE — 87427 SHIGA-LIKE TOXIN AG IA: CPT

## 2019-12-22 PROCEDURE — 87045 FECES CULTURE AEROBIC BACT: CPT

## 2019-12-22 PROCEDURE — 87046 STOOL CULTR AEROBIC BACT EA: CPT

## 2019-12-23 ENCOUNTER — HOSPITAL ENCOUNTER (OUTPATIENT)
Dept: CV DIAGNOSTICS | Age: 74
Discharge: HOME | End: 2019-12-23
Attending: NURSE PRACTITIONER
Payer: MEDICARE

## 2019-12-23 DIAGNOSIS — R01.1 MURMUR: ICD-10-CM

## 2019-12-23 PROCEDURE — 93306 TTE W/DOPPLER COMPLETE: CPT | Performed by: NURSE PRACTITIONER

## 2019-12-26 NOTE — TELEPHONE ENCOUNTER
Forwarded to Time Pacheco pt hold Plavix prior to below 5/1 colonoscopy? . I see in APN office note 12/18 that pt is also overdue for pacemaker check. Please advise also if pt has cardio clearance. Thanks.     I notified Beth in endo that pt has

## 2019-12-27 ENCOUNTER — TELEPHONE (OUTPATIENT)
Dept: GASTROENTEROLOGY | Facility: CLINIC | Age: 74
End: 2019-12-27

## 2019-12-27 NOTE — TELEPHONE ENCOUNTER
Notes recorded by MYRA Sebastian on 12/27/2019 at 9:16 AM CST  Nursing: Please let patient know that the stool culture/Shigella toxin demonstrates no active infection.  There should have been 2 other stool tests that were ordered at the same time.

## 2019-12-27 NOTE — TELEPHONE ENCOUNTER
Patient and daughter contacted, verified and results given. Patient states he never picked up other 2 stool kits but is feeling fine now and is not experiencing anymore diarrhea. Please advise. Thank you.

## 2019-12-29 NOTE — TELEPHONE ENCOUNTER
Please see prior instructions before colonoscopy and advise same instructions regarding blood thinners  Pacemaker as per cardiology

## 2019-12-30 NOTE — TELEPHONE ENCOUNTER
Noted, have already sent to cardio RNs--will also forward to [facility] APN, as she may be the one to see pt--it appears pt due for pacemaker check also. Will await Plavix orders and cardio clearance. Thanks.

## 2019-12-30 NOTE — TELEPHONE ENCOUNTER
Noted.  If the patient is not having any further diarrheal symptoms, may cautiously observe. He should proceed with colonoscopy as scheduled.

## 2019-12-30 NOTE — TELEPHONE ENCOUNTER
Reviewed with Dr. Anish Ruiz, patient able to hold plavix for 5 days prior to colonoscopy, wtg for device check to see if functioning prior to approval to proceed.

## 2020-01-03 NOTE — TELEPHONE ENCOUNTER
Patient called and informed of [address] below message. Patient verbalized message was understood and had no further questions.

## 2020-01-09 ENCOUNTER — TELEPHONE (OUTPATIENT)
Dept: CARDIOLOGY CLINIC | Facility: CLINIC | Age: 75
End: 2020-01-09

## 2020-01-09 NOTE — TELEPHONE ENCOUNTER
Notes recorded by Bryson Bautista RN on 12/31/2019 at 12:58 PM CST  Reviewed with LB, no new recommendations at this time.   ------    Notes recorded by MYRA Garcia on 12/27/2019 at 11:56 AM CST  Pls review echo with Dr. Moises Ormond    12/18 LOV

## 2020-01-14 NOTE — TELEPHONE ENCOUNTER
Received the signed verbal release consent that I mailed to pt, giving consent to speak to daughter Netta Corrigan. Added in FYI and sent to scan.

## 2020-01-16 ENCOUNTER — TELEPHONE (OUTPATIENT)
Dept: CARDIOLOGY CLINIC | Facility: CLINIC | Age: 75
End: 2020-01-16

## 2020-01-16 ENCOUNTER — NURSE ONLY (OUTPATIENT)
Dept: CARDIOLOGY CLINIC | Facility: CLINIC | Age: 75
End: 2020-01-16
Payer: MEDICARE

## 2020-01-16 ENCOUNTER — TELEPHONE (OUTPATIENT)
Dept: GASTROENTEROLOGY | Facility: CLINIC | Age: 75
End: 2020-01-16

## 2020-01-16 PROCEDURE — 93280 PM DEVICE PROGR EVAL DUAL: CPT | Performed by: INTERNAL MEDICINE

## 2020-01-16 RX ORDER — HYDROCHLOROTHIAZIDE 12.5 MG/1
12.5 TABLET ORAL DAILY
Qty: 90 TABLET | Refills: 1 | Status: SHIPPED | OUTPATIENT
Start: 2020-01-16 | End: 2020-01-18

## 2020-01-16 NOTE — TELEPHONE ENCOUNTER
Not sure about the insurance coverage for these meds. Review pended refill request as it does not fall under a protocol. Last Rx: 10/25/19  LOV: 11/19/19  Refill passed per [facility], [facility] protocol.   Diabetes Medications  Protocol Criteria:  · [facility] [facility] [place], MYRA Montalvo    Office Visit    1 month ago Diabetes mellitus type 2 in nonobese [facility])    Aries Rios MD    Office Visit    1 month ago Coronary artery disease involving nativ

## 2020-01-16 NOTE — TELEPHONE ENCOUNTER
[place] will be [place] until end of May. Requesting refills    Current Outpatient Medications:   •  Metoprolol Succinate  MG Oral Tablet 24 Hr, Take 1 tablet (100 mg total) by mouth daily. , Disp: 90 tablet, Rfl: 3  •  hydrochlorothiazide 12. 5

## 2020-01-16 NOTE — TELEPHONE ENCOUNTER
Pacemaker visit completed this am, no issues found. Surgical clearance approved.  Letter generated to ALONSO LOYA.

## 2020-01-16 NOTE — TELEPHONE ENCOUNTER
Joan Rodriguez MD  [facility] Clinical Staff          Previous Messages         Routed Note     Author: Joan Rodriguez MD Service: Rosendo Orozco Type: Physician   Filed: 1/16/2020  2:44 PM Encounter Date: 1/16/2020 Status: Signed   : Joan Rodriguez

## 2020-01-16 NOTE — PROGRESS NOTES
Pt. Had MI while vacationing in [place] [employer] [facility] 11/2017 and got a pacemaker after cardiac stent placed. Interrogation shows native rhythm is SR    No AF episodes.      Pacemaker parameters DDD 60/130ppm.     Atrial pacing 2%    ventricular pacing < 1%    Thresho

## 2020-01-16 NOTE — TELEPHONE ENCOUNTER
S/w pt and he only needs the hydrochlorothiazide refill. Creatinine elevated but with 30 % range.    Verified medication dose, Meets protocol , refill order sent  Hypertensive Medications  Protocol Criteria:  · Appointment scheduled with Cardiology in the p 10/23/2019    [facility] 297 (H) 10/23/2019

## 2020-01-16 NOTE — TELEPHONE ENCOUNTER
Called patient's daughter Dotty Worthington. OK per LATASHA. Informed her patient may proceed with colonoscopy in May per Dr. Selam Grewal. Needs to hold plavix 5 days prior to procedure. She would like all instructions for prep and plavix sent to [facility].  She will sign the

## 2020-01-16 NOTE — TELEPHONE ENCOUNTER
Pt stated he will be out of the country until end of May. Requesting refills.       Current Outpatient Medications:   •  Liraglutide (VICTOZA) 18 MG/3ML Subcutaneous Solution Pen-injector, 0.6 mg sq a day for 1 week then 1.2 mg a day, Disp: 3 pen, Rfl: 1

## 2020-01-17 RX ORDER — FERROUS SULFATE 325(65) MG
325 TABLET ORAL
Qty: 90 TABLET | Refills: 1 | Status: SHIPPED | OUTPATIENT
Start: 2020-01-17 | End: 2021-06-18

## 2020-01-17 RX ORDER — OLMESARTAN MEDOXOMIL AND HYDROCHLOROTHIAZIDE 40/12.5 40; 12.5 MG/1; MG/1
1 TABLET ORAL DAILY
Qty: 90 TABLET | Refills: 1 | Status: SHIPPED | OUTPATIENT
Start: 2020-01-17 | End: 2020-01-18

## 2020-01-18 ENCOUNTER — LAB ENCOUNTER (OUTPATIENT)
Dept: LAB | Age: 75
End: 2020-01-18
Attending: FAMILY MEDICINE
Payer: MEDICARE

## 2020-01-18 ENCOUNTER — OFFICE VISIT (OUTPATIENT)
Dept: FAMILY MEDICINE CLINIC | Facility: CLINIC | Age: 75
End: 2020-01-18
Payer: MEDICARE

## 2020-01-18 VITALS
WEIGHT: 150 LBS | HEART RATE: 81 BPM | BODY MASS INDEX: 23.54 KG/M2 | SYSTOLIC BLOOD PRESSURE: 131 MMHG | TEMPERATURE: 98 F | DIASTOLIC BLOOD PRESSURE: 85 MMHG | HEIGHT: 67 IN

## 2020-01-18 DIAGNOSIS — Z87.11 H/O GASTRIC ULCER: ICD-10-CM

## 2020-01-18 DIAGNOSIS — D50.9 IRON DEFICIENCY ANEMIA, UNSPECIFIED IRON DEFICIENCY ANEMIA TYPE: ICD-10-CM

## 2020-01-18 DIAGNOSIS — E11.9 DIABETES MELLITUS TYPE 2 IN NONOBESE (HCC): ICD-10-CM

## 2020-01-18 DIAGNOSIS — I10 ESSENTIAL HYPERTENSION: ICD-10-CM

## 2020-01-18 DIAGNOSIS — E11.9 DIABETES MELLITUS TYPE 2 IN NONOBESE (HCC): Primary | ICD-10-CM

## 2020-01-18 LAB
ALBUMIN SERPL-MCNC: 3.9 G/DL (ref 3.4–5)
ALBUMIN/GLOB SERPL: 1.1 {RATIO} (ref 1–2)
ALP LIVER SERPL-CCNC: 106 U/L (ref 45–117)
ALT SERPL-CCNC: 19 U/L (ref 16–61)
ANION GAP SERPL CALC-SCNC: 5 MMOL/L (ref 0–18)
AST SERPL-CCNC: 17 U/L (ref 15–37)
BASOPHILS # BLD AUTO: 0.04 X10(3) UL (ref 0–0.2)
BASOPHILS NFR BLD AUTO: 0.6 %
BILIRUB SERPL-MCNC: 0.5 MG/DL (ref 0.1–2)
BUN BLD-MCNC: 41 MG/DL (ref 7–18)
BUN/CREAT SERPL: 25.8 (ref 10–20)
CALCIUM BLD-MCNC: 9.1 MG/DL (ref 8.5–10.1)
CHLORIDE SERPL-SCNC: 107 MMOL/L (ref 98–112)
CO2 SERPL-SCNC: 26 MMOL/L (ref 21–32)
CREAT BLD-MCNC: 1.59 MG/DL (ref 0.7–1.3)
CREAT UR-SCNC: 133 MG/DL
DEPRECATED RDW RBC AUTO: 40.8 FL (ref 35.1–46.3)
EOSINOPHIL # BLD AUTO: 0.61 X10(3) UL (ref 0–0.7)
EOSINOPHIL NFR BLD AUTO: 8.5 %
ERYTHROCYTE [DISTWIDTH] IN BLOOD BY AUTOMATED COUNT: 12.3 % (ref 11–15)
EST. AVERAGE GLUCOSE BLD GHB EST-MCNC: 151 MG/DL (ref 68–126)
GLOBULIN PLAS-MCNC: 3.5 G/DL (ref 2.8–4.4)
GLUCOSE BLD-MCNC: 133 MG/DL (ref 70–99)
HBA1C MFR BLD HPLC: 6.9 % (ref ?–5.7)
HCT VFR BLD AUTO: 40.7 % (ref 39–53)
HGB BLD-MCNC: 13 G/DL (ref 13–17.5)
IMM GRANULOCYTES # BLD AUTO: 0.01 X10(3) UL (ref 0–1)
IMM GRANULOCYTES NFR BLD: 0.1 %
IRON SATURATION: 16 % (ref 20–50)
IRON SERPL-MCNC: 55 UG/DL (ref 65–175)
LYMPHOCYTES # BLD AUTO: 2.24 X10(3) UL (ref 1–4)
LYMPHOCYTES NFR BLD AUTO: 31.1 %
M PROTEIN MFR SERPL ELPH: 7.4 G/DL (ref 6.4–8.2)
MCH RBC QN AUTO: 29 PG (ref 26–34)
MCHC RBC AUTO-ENTMCNC: 31.9 G/DL (ref 31–37)
MCV RBC AUTO: 90.8 FL (ref 80–100)
MICROALBUMIN UR-MCNC: 2.3 MG/DL
MICROALBUMIN/CREAT 24H UR-RTO: 17.3 UG/MG (ref ?–30)
MONOCYTES # BLD AUTO: 0.54 X10(3) UL (ref 0.1–1)
MONOCYTES NFR BLD AUTO: 7.5 %
NEUTROPHILS # BLD AUTO: 3.77 X10 (3) UL (ref 1.5–7.7)
NEUTROPHILS # BLD AUTO: 3.77 X10(3) UL (ref 1.5–7.7)
NEUTROPHILS NFR BLD AUTO: 52.2 %
OSMOLALITY SERPL CALC.SUM OF ELEC: 298 MOSM/KG (ref 275–295)
PATIENT FASTING Y/N/NP: YES
PLATELET # BLD AUTO: 204 10(3)UL (ref 150–450)
POTASSIUM SERPL-SCNC: 4.8 MMOL/L (ref 3.5–5.1)
RBC # BLD AUTO: 4.48 X10(6)UL (ref 3.8–5.8)
SODIUM SERPL-SCNC: 138 MMOL/L (ref 136–145)
TOTAL IRON BINDING CAPACITY: 350 UG/DL (ref 240–450)
TRANSFERRIN SERPL-MCNC: 235 MG/DL (ref 200–360)
WBC # BLD AUTO: 7.2 X10(3) UL (ref 4–11)

## 2020-01-18 PROCEDURE — 82043 UR ALBUMIN QUANTITATIVE: CPT | Performed by: FAMILY MEDICINE

## 2020-01-18 PROCEDURE — 83540 ASSAY OF IRON: CPT

## 2020-01-18 PROCEDURE — 85025 COMPLETE CBC W/AUTO DIFF WBC: CPT

## 2020-01-18 PROCEDURE — G0463 HOSPITAL OUTPT CLINIC VISIT: HCPCS | Performed by: FAMILY MEDICINE

## 2020-01-18 PROCEDURE — 82570 ASSAY OF URINE CREATININE: CPT | Performed by: FAMILY MEDICINE

## 2020-01-18 PROCEDURE — 84466 ASSAY OF TRANSFERRIN: CPT

## 2020-01-18 PROCEDURE — 36415 COLL VENOUS BLD VENIPUNCTURE: CPT

## 2020-01-18 PROCEDURE — 80053 COMPREHEN METABOLIC PANEL: CPT

## 2020-01-18 PROCEDURE — 83036 HEMOGLOBIN GLYCOSYLATED A1C: CPT

## 2020-01-18 PROCEDURE — 99214 OFFICE O/P EST MOD 30 MIN: CPT | Performed by: FAMILY MEDICINE

## 2020-01-18 RX ORDER — ATORVASTATIN CALCIUM 40 MG/1
40 TABLET, FILM COATED ORAL NIGHTLY
Qty: 90 TABLET | Refills: 1 | Status: SHIPPED | OUTPATIENT
Start: 2020-01-18 | End: 2020-10-31

## 2020-01-18 RX ORDER — HYDROCHLOROTHIAZIDE 12.5 MG/1
12.5 TABLET ORAL DAILY
Qty: 90 TABLET | Refills: 1 | Status: SHIPPED | OUTPATIENT
Start: 2020-01-18 | End: 2021-06-18

## 2020-01-18 RX ORDER — OLMESARTAN MEDOXOMIL AND HYDROCHLOROTHIAZIDE 40/12.5 40; 12.5 MG/1; MG/1
1 TABLET ORAL DAILY
Qty: 90 TABLET | Refills: 1 | Status: SHIPPED | OUTPATIENT
Start: 2020-01-18 | End: 2021-06-18

## 2020-01-18 RX ORDER — PANTOPRAZOLE SODIUM 40 MG/1
40 TABLET, DELAYED RELEASE ORAL
Qty: 90 TABLET | Refills: 1 | Status: SHIPPED | OUTPATIENT
Start: 2020-01-18 | End: 2020-10-31

## 2020-01-18 RX ORDER — METOPROLOL SUCCINATE 100 MG/1
100 TABLET, EXTENDED RELEASE ORAL DAILY
Qty: 90 TABLET | Refills: 3 | Status: SHIPPED | OUTPATIENT
Start: 2020-01-18 | End: 2021-06-18

## 2020-01-18 NOTE — PROGRESS NOTES
HPI:    Patient ID: Hitesh Gonzalez is a 76year old male. Here for f/u diabetes and hypertenson . Also had upper GI. On questioning not taking protonix . Was dg with ulcer.  Denies any pain      Review of Systems   Constitutional: Negative for activity c has no wheezes. He has no rales. He exhibits no tenderness. Abdominal: Soft. Bowel sounds are normal. He exhibits no distension. There is no tenderness. There is no rebound and no guarding. Musculoskeletal:         General: No edema.      Neurological: He

## 2020-01-20 NOTE — TELEPHONE ENCOUNTER
CBLM regarding signing up for Mychart, if patient family calls back.  Please transfer to [facility] in GI

## 2020-01-23 ENCOUNTER — OFFICE VISIT (OUTPATIENT)
Dept: FAMILY MEDICINE CLINIC | Facility: CLINIC | Age: 75
End: 2020-01-23
Payer: MEDICARE

## 2020-01-23 VITALS
WEIGHT: 150 LBS | HEIGHT: 67 IN | TEMPERATURE: 98 F | SYSTOLIC BLOOD PRESSURE: 129 MMHG | DIASTOLIC BLOOD PRESSURE: 79 MMHG | BODY MASS INDEX: 23.54 KG/M2 | HEART RATE: 89 BPM

## 2020-01-23 DIAGNOSIS — E11.22 TYPE 2 DIABETES MELLITUS WITH STAGE 3 CHRONIC KIDNEY DISEASE, WITHOUT LONG-TERM CURRENT USE OF INSULIN (HCC): ICD-10-CM

## 2020-01-23 DIAGNOSIS — N18.30 TYPE 2 DIABETES MELLITUS WITH STAGE 3 CHRONIC KIDNEY DISEASE, WITHOUT LONG-TERM CURRENT USE OF INSULIN (HCC): ICD-10-CM

## 2020-01-23 DIAGNOSIS — N28.9 RENAL INSUFFICIENCY: Primary | ICD-10-CM

## 2020-01-23 PROCEDURE — 99214 OFFICE O/P EST MOD 30 MIN: CPT | Performed by: FAMILY MEDICINE

## 2020-01-23 PROCEDURE — G0463 HOSPITAL OUTPT CLINIC VISIT: HCPCS | Performed by: FAMILY MEDICINE

## 2020-01-23 NOTE — PROGRESS NOTES
HPI:    Patient ID: Demetrius Toribio is a 76year old male. Here for f/u diabetes, hypertension. Doing well. No complants. Blood tests good except some renal insuficiency      Review of Systems   Constitutional: Negative.   Negative for activity change, appe ASSESSMENT/PLAN:   Renal insufficiency  (primary encounter diagnosis)  F/u in 3 months  Diabetes mellitus- cpm   Hypertension - cpm   Orders Placed This Encounter      Basic Metabolic Panel (8) [E]      Meds This Visit:  Requested Prescription

## 2020-04-02 ENCOUNTER — TELEPHONE (OUTPATIENT)
Dept: GASTROENTEROLOGY | Facility: CLINIC | Age: 75
End: 2020-04-02

## 2020-04-02 DIAGNOSIS — K56.699 COLON STRICTURE (HCC): ICD-10-CM

## 2020-04-02 DIAGNOSIS — Z12.11 COLON CANCER SCREENING: Primary | ICD-10-CM

## 2020-04-02 DIAGNOSIS — Z86.010 HISTORY OF COLON POLYPS: ICD-10-CM

## 2020-04-02 NOTE — TELEPHONE ENCOUNTER
See 1/22 [employer] message where pt was sent colonoscopy and Plavix instructions. Also 1/16 and 12/19 encounters. I note pt has not read instructions. I left complete message on cell voicemail to read the instructions thoroughly and call us with questions.

## 2020-04-02 NOTE — TELEPHONE ENCOUNTER
Left another message on voicemail to call for instructions. In addition to holding the Plavix for 5 days, needs to know to continue the aspirin.

## 2020-04-07 NOTE — TELEPHONE ENCOUNTER
Pt's daughter called Eliz Vitale) [phone] needs to talk to someone in office. . Father is still in [facility] and can not get back for surgery . .please advise

## 2020-04-07 NOTE — TELEPHONE ENCOUNTER
Contacted patients daughter, she is not sure when her father will be back from [place]. Would like to cancel only for now.

## 2020-04-07 NOTE — TELEPHONE ENCOUNTER
Attempted to reach patient on cell [phone] and mailbox full. Attempted home [phone], rings and goes to busy. Attempted daughter Pantera Michelle [phone] rings and goes to busy. Attempted spouse Martina Tracey [phone], rings and goes to busy.  Patient has

## 2020-04-07 NOTE — TELEPHONE ENCOUNTER
Cancelled for:  COLON [id number]           Provider Name: Dr Xin Ponce  Date: 05/01/2020   Location:  [facility]  Sedation:  MAC  Time:  9:45AM (pt is aware to arrive at 8:45AM)  Prep: COLYTE Prep instructions were given to pt's daughter over the phone, pt verbalized unde

## 2020-04-14 ENCOUNTER — TELEPHONE (OUTPATIENT)
Dept: CARDIOLOGY CLINIC | Facility: CLINIC | Age: 75
End: 2020-04-14

## 2020-04-14 NOTE — TELEPHONE ENCOUNTER
Pts daughter states that pt is out of the country and is completely out of medication. Does pt need to take this medication or is it OK to be without? Please call.         Current Outpatient Medications:     •  Clopidogrel Bisulfate 75 MG Oral Tab, Take 1

## 2020-04-15 NOTE — TELEPHONE ENCOUNTER
S/w Regis Schmitt, daughter, and is aware that patient should not stop his Clopidogrel with his history of stent.  She will notify her dad and will attempt to refill the clopidogrel at the [facility] in [facility]

## 2020-05-27 RX ORDER — CLOPIDOGREL BISULFATE 75 MG/1
TABLET ORAL
Qty: 90 TABLET | Refills: 1 | Status: SHIPPED | OUTPATIENT
Start: 2020-05-27 | End: 2021-06-18

## 2020-05-27 NOTE — TELEPHONE ENCOUNTER
Clopidogrel chart reviewed, no change in this medication.  Meets protocol, refill sent  Antiplatelet Medications  Protocol Criteria:  · Appointment scheduled with Cardiology in the past 12 months or in the next 3 months  · CBC in past 12 months  · Hgb great

## 2020-10-27 ENCOUNTER — TELEPHONE (OUTPATIENT)
Dept: FAMILY MEDICINE CLINIC | Facility: CLINIC | Age: 75
End: 2020-10-27

## 2020-10-31 RX ORDER — ATORVASTATIN CALCIUM 40 MG/1
TABLET, FILM COATED ORAL
Qty: 90 TABLET | Refills: 0 | Status: SHIPPED | OUTPATIENT
Start: 2020-10-31 | End: 2021-06-18

## 2020-10-31 RX ORDER — PANTOPRAZOLE SODIUM 40 MG/1
TABLET, DELAYED RELEASE ORAL
Qty: 90 TABLET | Refills: 0 | Status: SHIPPED | OUTPATIENT
Start: 2020-10-31 | End: 2021-06-18

## 2021-02-03 DIAGNOSIS — Z23 NEED FOR VACCINATION: ICD-10-CM

## 2021-06-18 ENCOUNTER — LAB ENCOUNTER (OUTPATIENT)
Dept: LAB | Age: 76
End: 2021-06-18
Attending: FAMILY MEDICINE
Payer: MEDICARE

## 2021-06-18 ENCOUNTER — OFFICE VISIT (OUTPATIENT)
Dept: FAMILY MEDICINE CLINIC | Facility: CLINIC | Age: 76
End: 2021-06-18
Payer: MEDICARE

## 2021-06-18 VITALS
WEIGHT: 148 LBS | HEIGHT: 67 IN | DIASTOLIC BLOOD PRESSURE: 85 MMHG | BODY MASS INDEX: 23.23 KG/M2 | SYSTOLIC BLOOD PRESSURE: 137 MMHG

## 2021-06-18 DIAGNOSIS — I25.10 CORONARY ARTERY DISEASE INVOLVING NATIVE HEART, UNSPECIFIED VESSEL OR LESION TYPE, UNSPECIFIED WHETHER ANGINA PRESENT: ICD-10-CM

## 2021-06-18 DIAGNOSIS — E11.22 TYPE 2 DIABETES MELLITUS WITH STAGE 3 CHRONIC KIDNEY DISEASE, WITHOUT LONG-TERM CURRENT USE OF INSULIN, UNSPECIFIED WHETHER STAGE 3A OR 3B CKD (HCC): ICD-10-CM

## 2021-06-18 DIAGNOSIS — N18.30 TYPE 2 DIABETES MELLITUS WITH STAGE 3 CHRONIC KIDNEY DISEASE, WITHOUT LONG-TERM CURRENT USE OF INSULIN, UNSPECIFIED WHETHER STAGE 3A OR 3B CKD (HCC): ICD-10-CM

## 2021-06-18 DIAGNOSIS — I25.10 CORONARY ARTERY DISEASE INVOLVING NATIVE CORONARY ARTERY OF NATIVE HEART WITHOUT ANGINA PECTORIS: ICD-10-CM

## 2021-06-18 DIAGNOSIS — Z00.00 ENCOUNTER FOR ANNUAL HEALTH EXAMINATION: ICD-10-CM

## 2021-06-18 DIAGNOSIS — K56.699 COLON STRICTURE (HCC): Primary | ICD-10-CM

## 2021-06-18 PROCEDURE — G0439 PPPS, SUBSEQ VISIT: HCPCS | Performed by: FAMILY MEDICINE

## 2021-06-18 PROCEDURE — 84443 ASSAY THYROID STIM HORMONE: CPT

## 2021-06-18 PROCEDURE — 80061 LIPID PANEL: CPT

## 2021-06-18 PROCEDURE — 83036 HEMOGLOBIN GLYCOSYLATED A1C: CPT

## 2021-06-18 PROCEDURE — 36415 COLL VENOUS BLD VENIPUNCTURE: CPT

## 2021-06-18 PROCEDURE — 80053 COMPREHEN METABOLIC PANEL: CPT

## 2021-06-18 PROCEDURE — 85025 COMPLETE CBC W/AUTO DIFF WBC: CPT

## 2021-06-18 PROCEDURE — 99213 OFFICE O/P EST LOW 20 MIN: CPT | Performed by: FAMILY MEDICINE

## 2021-06-18 RX ORDER — OLMESARTAN MEDOXOMIL AND HYDROCHLOROTHIAZIDE 40/12.5 40; 12.5 MG/1; MG/1
1 TABLET ORAL DAILY
Qty: 90 TABLET | Refills: 1 | Status: SHIPPED | OUTPATIENT
Start: 2021-06-18

## 2021-06-18 RX ORDER — ATORVASTATIN CALCIUM 40 MG/1
40 TABLET, FILM COATED ORAL NIGHTLY
Qty: 90 TABLET | Refills: 0 | Status: SHIPPED | OUTPATIENT
Start: 2021-06-18 | End: 2021-06-25

## 2021-06-18 RX ORDER — HYDROCHLOROTHIAZIDE 12.5 MG/1
12.5 TABLET ORAL DAILY
Qty: 90 TABLET | Refills: 1 | Status: SHIPPED | OUTPATIENT
Start: 2021-06-18

## 2021-06-18 RX ORDER — METOPROLOL SUCCINATE 100 MG/1
100 TABLET, EXTENDED RELEASE ORAL DAILY
Qty: 90 TABLET | Refills: 3 | Status: SHIPPED | OUTPATIENT
Start: 2021-06-18

## 2021-06-18 RX ORDER — CLOPIDOGREL BISULFATE 75 MG/1
75 TABLET ORAL DAILY
Qty: 90 TABLET | Refills: 1 | Status: SHIPPED | OUTPATIENT
Start: 2021-06-18

## 2021-06-18 NOTE — PROGRESS NOTES
HPI:   Don Marroquin is a 68year old male who presents for a Medicare Subsequent Annual Wellness visit (Pt already had Initial Annual Wellness).       His last annual assessment has been over 1 year: Annual Physical Never done         Fall/Risk Assessment U Please Preform Now    Cage NOT Performed in the last 6 months, please do assessment! Last Cage score was  on .          Patient Care Team: Patient Care Team:  Neel Stevenson MD as PCP - General (Family Medicine)    Patient Active Problem List:     Coronary Cardiac pacemaker placement; and colonoscopy (N/A, 12/5/2019). His family history includes Diabetes in his sister. SOCIAL HISTORY:   He  reports that he has quit smoking. He smoked 0.00 packs per day.  He has never used smokeless tobacco. He reports cu trouble understanding the speech of women and children: Sometimes I have trouble understanding the speaker in a large room such as at a meeting or place of Christian: Sometimes   Many people I talk to seem to mumble (or don't speak clearly): No People get an • FLU VAC High Dose 65 YRS & Older PRSV Free (17111) 10/23/2019   • Pneumococcal (Prevnar 13) 11/19/2019   • Pneumovax 23 11/13/2018        ASSESSMENT AND OTHER RELEVANT CHRONIC CONDITIONS:   Ama Velasco is a 68year old male who presents for a Medicare PLAN:  The patient indicates understanding of these issues and agrees to the plan. Reinforced healthy diet, lifestyle, and exercise. No follow-ups on file.      More Reddy MD, 6/18/2021     General Health           Supplementary Documentation: Prostate-Specific Antigen (PSA) Annually No results found for: PSA  There are no preventive care reminders to display for this patient.    Immunizations    Influenza Covered once per flu season  Please get every year -  No recommendations at this time    Pn

## 2021-06-18 NOTE — TELEPHONE ENCOUNTER
Patient's daughter, Terra Conley, is asking for refill for   All his medications to [facility]    90day supply    ATORVASTATIN 40 MG Oral Tab    CLOPIDOGREL BISULFATE 75 MG Oral Tab    hydrochlorothiazide 12.5 MG Oral Tab    METFORMIN HCL 1000 MG Oral Tab

## 2021-06-18 NOTE — PATIENT INSTRUCTIONS
Elliot Wei's SCREENING SCHEDULE   Tests on this list are recommended by your physician but may not be covered, or covered at this frequency, by your insurer. Please check with your insurance carrier before scheduling to verify coverage.    PREVENTATIV Pneumococcal Each vaccine (Kylrukz16 & [id number]) covered once after 65 Prevnar 13: 11/19/2019    [id number]: 11/13/2018     No recommendations at this time    Hepatitis B One screening covered for patients with certain risk factors   -  No recommendati including definitions of the different types of Advance Directives. It also has the State forms available on it's website for anyone to review and print using their home computer and printer. (the forms are also available in [address])  www. [employer]. or

## 2021-06-25 ENCOUNTER — HOSPITAL ENCOUNTER (OUTPATIENT)
Dept: CT IMAGING | Facility: HOSPITAL | Age: 76
Discharge: HOME | End: 2021-06-25
Attending: FAMILY MEDICINE
Payer: MEDICARE

## 2021-06-25 ENCOUNTER — OFFICE VISIT (OUTPATIENT)
Dept: CARDIOLOGY CLINIC | Facility: CLINIC | Age: 76
End: 2021-06-25
Payer: MEDICARE

## 2021-06-25 ENCOUNTER — TELEPHONE (OUTPATIENT)
Dept: CARDIOLOGY CLINIC | Facility: CLINIC | Age: 76
End: 2021-06-25

## 2021-06-25 VITALS
WEIGHT: 148 LBS | BODY MASS INDEX: 23.23 KG/M2 | SYSTOLIC BLOOD PRESSURE: 130 MMHG | DIASTOLIC BLOOD PRESSURE: 68 MMHG | HEIGHT: 67 IN | HEART RATE: 64 BPM

## 2021-06-25 DIAGNOSIS — K56.699 COLON STRICTURE (HCC): ICD-10-CM

## 2021-06-25 DIAGNOSIS — R06.00 DYSPNEA, UNSPECIFIED TYPE: Primary | ICD-10-CM

## 2021-06-25 DIAGNOSIS — I25.10 CORONARY ARTERY DISEASE INVOLVING NATIVE CORONARY ARTERY OF NATIVE HEART WITHOUT ANGINA PECTORIS: ICD-10-CM

## 2021-06-25 PROCEDURE — 74177 CT ABD & PELVIS W/CONTRAST: CPT | Performed by: FAMILY MEDICINE

## 2021-06-25 PROCEDURE — 99214 OFFICE O/P EST MOD 30 MIN: CPT | Performed by: NURSE PRACTITIONER

## 2021-06-25 RX ORDER — ATORVASTATIN CALCIUM 40 MG/1
80 TABLET, FILM COATED ORAL NIGHTLY
Qty: 90 TABLET | Refills: 1 | Status: SHIPPED | OUTPATIENT
Start: 2021-06-25 | End: 2021-06-25

## 2021-06-25 RX ORDER — ATORVASTATIN CALCIUM 80 MG/1
80 TABLET, FILM COATED ORAL NIGHTLY
Qty: 90 TABLET | Refills: 1 | Status: SHIPPED | OUTPATIENT
Start: 2021-06-25

## 2021-06-25 NOTE — PROGRESS NOTES
Coty Funze is a 68year old male. Patient presents with: Follow - Up: pt in for f/u CAD, doing well, pt stated SOB while walking up stairs    HPI:   Patient comes in today for follow-up.  He sees Dr. Yuan Borders I saw him 2 years ago prior to the pandemic he has Use      Vaping Use: Never used    Alcohol use: Yes      Comment: few times a year    Drug use: Never       REVIEW OF SYSTEMS:   GENERAL HEALTH: feels well otherwise  SKIN: denies any unusual skin lesions or rashes  RESPIRATORY: denies shortness of breath

## 2021-06-25 NOTE — PATIENT INSTRUCTIONS
1. Stress test  2. Increase atorvastatin to 80mg daily (2 pills of 40mg) check lipids in 2 months  3. Pacer check in 1-2 months  4.  See Dr. Kyle Lacy in 2-3 months

## 2021-07-06 ENCOUNTER — LAB ENCOUNTER (OUTPATIENT)
Dept: LAB | Age: 76
End: 2021-07-06
Attending: NURSE PRACTITIONER
Payer: MEDICARE

## 2021-07-06 DIAGNOSIS — N18.30 TYPE 2 DIABETES MELLITUS WITH STAGE 3 CHRONIC KIDNEY DISEASE, WITHOUT LONG-TERM CURRENT USE OF INSULIN, UNSPECIFIED WHETHER STAGE 3A OR 3B CKD (HCC): ICD-10-CM

## 2021-07-06 DIAGNOSIS — R06.00 DYSPNEA, UNSPECIFIED TYPE: ICD-10-CM

## 2021-07-06 DIAGNOSIS — I25.10 CORONARY ARTERY DISEASE INVOLVING NATIVE CORONARY ARTERY OF NATIVE HEART WITHOUT ANGINA PECTORIS: ICD-10-CM

## 2021-07-06 DIAGNOSIS — E11.22 TYPE 2 DIABETES MELLITUS WITH STAGE 3 CHRONIC KIDNEY DISEASE, WITHOUT LONG-TERM CURRENT USE OF INSULIN, UNSPECIFIED WHETHER STAGE 3A OR 3B CKD (HCC): ICD-10-CM

## 2021-07-06 LAB
ALT SERPL-CCNC: 20 U/L
AST SERPL-CCNC: 17 U/L (ref 15–37)
CHOLEST SMN-MCNC: 116 MG/DL (ref ?–200)
CREAT UR-SCNC: 80.8 MG/DL
HDLC SERPL-MCNC: 33 MG/DL (ref 40–59)
LDLC SERPL CALC-MCNC: 49 MG/DL (ref ?–100)
MICROALBUMIN UR-MCNC: 0.72 MG/DL
MICROALBUMIN/CREAT 24H UR-RTO: 8.9 UG/MG (ref ?–30)
NONHDLC SERPL-MCNC: 83 MG/DL (ref ?–130)
PATIENT FASTING Y/N/NP: YES
SARS-COV-2 RNA RESP QL NAA+PROBE: NOT DETECTED
TRIGL SERPL-MCNC: 208 MG/DL (ref 30–149)
VLDLC SERPL CALC-MCNC: 30 MG/DL (ref 0–30)

## 2021-07-06 PROCEDURE — 80061 LIPID PANEL: CPT

## 2021-07-06 PROCEDURE — 84450 TRANSFERASE (AST) (SGOT): CPT

## 2021-07-06 PROCEDURE — 82570 ASSAY OF URINE CREATININE: CPT

## 2021-07-06 PROCEDURE — 82043 UR ALBUMIN QUANTITATIVE: CPT

## 2021-07-06 PROCEDURE — 36415 COLL VENOUS BLD VENIPUNCTURE: CPT

## 2021-07-06 PROCEDURE — 84460 ALANINE AMINO (ALT) (SGPT): CPT

## 2021-07-08 ENCOUNTER — HOSPITAL ENCOUNTER (OUTPATIENT)
Dept: CV DIAGNOSTICS | Facility: HOSPITAL | Age: 76
Discharge: HOME | End: 2021-07-08
Attending: NURSE PRACTITIONER
Payer: MEDICARE

## 2021-07-08 DIAGNOSIS — R06.00 DYSPNEA, UNSPECIFIED TYPE: ICD-10-CM

## 2021-07-08 PROCEDURE — 78452 HT MUSCLE IMAGE SPECT MULT: CPT | Performed by: NURSE PRACTITIONER

## 2021-07-08 PROCEDURE — 93017 CV STRESS TEST TRACING ONLY: CPT | Performed by: NURSE PRACTITIONER

## 2021-07-08 PROCEDURE — 93018 CV STRESS TEST I&R ONLY: CPT | Performed by: NURSE PRACTITIONER

## 2021-07-08 NOTE — PROGRESS NOTES
Here for  Exercise Nuclear stress test.  Has a pacemaker. EKG NSR LBBB at rest.  Janessa Sears per Dr Heather Stratton. Steffanie ellsworth completed without discomfort. Awaiting images.

## 2021-07-12 ENCOUNTER — TELEPHONE (OUTPATIENT)
Dept: CARDIOLOGY CLINIC | Facility: CLINIC | Age: 76
End: 2021-07-12

## 2021-07-12 DIAGNOSIS — R06.00 DYSPNEA, UNSPECIFIED TYPE: ICD-10-CM

## 2021-07-12 DIAGNOSIS — R93.1 DECREASED CARDIAC EJECTION FRACTION: ICD-10-CM

## 2021-07-12 DIAGNOSIS — R01.1 MURMUR: ICD-10-CM

## 2021-07-12 DIAGNOSIS — R94.39 ABNORMAL NUCLEAR STRESS TEST: Primary | ICD-10-CM

## 2021-07-12 NOTE — TELEPHONE ENCOUNTER
Called daughter Regine Martin, reviewed stress test results, advised to schedule echo as per  recommendation.      MYRA Sharp  P Em Cardio Clinical Staff  Actually review with Dr. Keerthi Alcantara as his EF is low at 33%      Dayton Heller RN   7/1

## 2021-07-28 ENCOUNTER — HOSPITAL ENCOUNTER (OUTPATIENT)
Dept: CV DIAGNOSTICS | Facility: HOSPITAL | Age: 76
Discharge: HOME | End: 2021-07-28
Attending: INTERNAL MEDICINE
Payer: MEDICARE

## 2021-07-28 DIAGNOSIS — R01.1 MURMUR: ICD-10-CM

## 2021-07-28 DIAGNOSIS — R93.1 DECREASED CARDIAC EJECTION FRACTION: ICD-10-CM

## 2021-07-28 DIAGNOSIS — R06.00 DYSPNEA, UNSPECIFIED TYPE: ICD-10-CM

## 2021-07-28 DIAGNOSIS — R94.39 ABNORMAL NUCLEAR STRESS TEST: ICD-10-CM

## 2021-07-28 PROCEDURE — 93306 TTE W/DOPPLER COMPLETE: CPT | Performed by: INTERNAL MEDICINE

## 2021-08-02 ENCOUNTER — NURSE TRIAGE (OUTPATIENT)
Dept: FAMILY MEDICINE CLINIC | Facility: CLINIC | Age: 76
End: 2021-08-02

## 2021-08-02 NOTE — TELEPHONE ENCOUNTER
Action Requested: Summary for Provider     []  Critical Lab, Recommendations Needed  [] Need Additional Advice  []   FYI    []   Need Orders  [] Need Medications Sent to Pharmacy  [x]  Other     SUMMARY:   Verified name and  of patient.   Daughter of reynold

## 2021-08-04 NOTE — PROGRESS NOTES
HPI/Subjective:   Patient ID: Ama Velasco is a 68year old male. Patient feels depressed. Not eating not doing much. Grandson incarcereted and feels very depressed about that.  Daughter with him          History/Other:   Review of Systems   Constitution General: Tenderness present. No deformity. Normal range of motion. Neurological:      Deep Tendon Reflexes: Reflexes are normal and symmetric.          Assessment & Plan:   Reactive depression  (primary encounter diagnosis)  Start remeron   F/u in

## 2021-12-17 RX ORDER — ATORVASTATIN CALCIUM 40 MG/1
TABLET, FILM COATED ORAL
Qty: 90 TABLET | Refills: 0 | Status: SHIPPED | OUTPATIENT
Start: 2021-12-17

## 2022-03-15 NOTE — TELEPHONE ENCOUNTER
[employer] message sent regarding a follow up appointment and  atorvastatin dose clarification before sending  this request to his provider. CSS=please call and assists. No future appointments. Refill passed per [place] [employer] [place], [facility] protocol. Requested Prescriptions   Pending Prescriptions Disp Refills    HYDROCHLOROTHIAZIDE 12.5 MG Oral Tab [Pharmacy Med Name: hydroCHLOROthiazide Oral Tablet 12.5 MG] 90 tablet 0     Sig: TAKE 1 TABLET BY MOUTH DAILY        Hypertensive Medications Protocol Failed - 3/14/2022  9:49 PM        Failed - Appointment in past 6 or next 3 months        Failed - GFR Non- > 50     Lab Results   Component Value Date    GFRNAA 43 (L) 06/18/2021                 Passed - CMP or BMP in past 12 months           OLMESARTAN MEDOXOMIL-HCTZ 40-12.5 MG Oral Tab [Pharmacy Med Name: Olmesartan Medoxomil-HCTZ Oral Tablet 40-12.5 MG] 90 tablet 0     Sig: Take 1 tablet by mouth daily.         Hypertensive Medications Protocol Failed - 3/14/2022  9:49 PM        Failed - Appointment in past 6 or next 3 months        Failed - GFR Non- > 50     Lab Results   Component Value Date    GFRNAA 43 (L) 06/18/2021                 Passed - CMP or BMP in past 12 months           METFORMIN HCL 1000 MG Oral Tab [Pharmacy Med Name: metFORMIN HCl Oral Tablet 1000 MG] 180 tablet 0     Sig: TAKE ONE TABLET BY MOUTH TWICE A DAY WITH MEALS        Diabetes Medication Protocol Failed - 3/14/2022  9:49 PM        Failed - Last A1C < 7.5 and within past 6 months     Lab Results   Component Value Date    A1C 6.6 (H) 06/18/2021               Failed - Appointment in past 6 or next 3 months        Failed - GFR Non- > 50     Lab Results   Component Value Date    GFRNAA 43 (L) 06/18/2021                 Passed - GFR in the past 12 months           CLOPIDOGREL 75 MG Oral Tab [Pharmacy Med Name: Clopidogrel Bisulfate Oral Tablet 75 MG] 90 tablet 0     Sig: TAKE 1 TABLET BY MOUTH DAILY There is no refill protocol information for this order        ATORVASTATIN 40 MG Oral Tab [Pharmacy Med Name: Atorvastatin Calcium Oral Tablet 40 MG] 90 tablet 0     Sig: TAKE 1 TABLET BY MOUTH NIGHTLY        Cholesterol Medication Protocol Passed - 3/14/2022  9:49 PM        Passed - ALT in past 12 months        Passed - LDL in past 12 months        Passed - Last ALT < 80       Lab Results   Component Value Date    ALT 20 07/06/2021             Passed - Last LDL < 130     Lab Results   Component Value Date    LDL 49 07/06/2021               Passed - Appointment in past 12 or next 3 months                    Recent Outpatient Visits              7 months ago Reactive depression    Alexia Navarro, Osmany Lock MD    Office Visit    8 months ago Dyspnea, unspecified type    [facility] MYRA Walker    Office Visit    8 months ago Colon stricture [facility])    Katy Tolbert MD    Office Visit    2 years ago Renal insufficiency    Katy Tolbert MD    Office Visit    2 years ago Diabetes mellitus type 2 in nonobese [facility])    Katy Tolbert MD    Office Visit

## 2022-03-16 RX ORDER — HYDROCHLOROTHIAZIDE 12.5 MG/1
12.5 TABLET ORAL DAILY
Qty: 30 TABLET | Refills: 0 | Status: SHIPPED | OUTPATIENT
Start: 2022-03-16 | End: 2022-04-08

## 2022-03-16 RX ORDER — CLOPIDOGREL BISULFATE 75 MG/1
75 TABLET ORAL DAILY
Qty: 30 TABLET | Refills: 0 | Status: SHIPPED | OUTPATIENT
Start: 2022-03-16 | End: 2022-05-27

## 2022-03-16 RX ORDER — ATORVASTATIN CALCIUM 40 MG/1
40 TABLET, FILM COATED ORAL NIGHTLY
Qty: 30 TABLET | Refills: 0 | Status: SHIPPED | OUTPATIENT
Start: 2022-03-16 | End: 2022-04-08

## 2022-03-16 RX ORDER — OLMESARTAN MEDOXOMIL AND HYDROCHLOROTHIAZIDE 40/12.5 40; 12.5 MG/1; MG/1
1 TABLET ORAL DAILY
Qty: 90 TABLET | Refills: 0 | Status: SHIPPED | OUTPATIENT
Start: 2022-03-16 | End: 2022-04-08

## 2022-03-16 NOTE — TELEPHONE ENCOUNTER
Routed to Dr León Roberts for advise, thanks. See below message regarding gen lipitor.        Please review refill protocol failed/ no protocol  Requested Prescriptions   Pending Prescriptions Disp Refills    HYDROCHLOROTHIAZIDE 12.5 MG Oral Tab [Pharmacy Med Name: hydroCHLOROthiazide Oral Tablet 12.5 MG] 90 tablet 0     Sig: TAKE 1 TABLET BY MOUTH DAILY        Hypertensive Medications Protocol Failed - 3/14/2022  9:54 PM        Failed - Appointment in past 6 or next 3 months        Failed - GFR Non- > 50     Lab Results   Component Value Date    GFRNAA 43 (L) 06/18/2021                 Passed - CMP or BMP in past 12 months           OLMESARTAN MEDOXOMIL-HCTZ 40-12.5 MG Oral Tab [Pharmacy Med Name: Olmesartan Medoxomil-HCTZ Oral Tablet 40-12.5 MG] 90 tablet 0     Sig: TAKE 1 TABLET BY MOUTH DAILY        Hypertensive Medications Protocol Failed - 3/14/2022  9:54 PM        Failed - Appointment in past 6 or next 3 months        Failed - GFR Non- > 50     Lab Results   Component Value Date    GFRNAA 43 (L) 06/18/2021                 Passed - CMP or BMP in past 12 months           METFORMIN HCL 1000 MG Oral Tab [Pharmacy Med Name: metFORMIN HCl Oral Tablet 1000 MG] 180 tablet 0     Sig: TAKE ONE TABLET BY MOUTH TWICE A DAY WITH MEALS        Diabetes Medication Protocol Failed - 3/14/2022  9:54 PM        Failed - Last A1C < 7.5 and within past 6 months     Lab Results   Component Value Date    A1C 6.6 (H) 06/18/2021               Failed - Appointment in past 6 or next 3 months        Failed - GFR Non- > 50     Lab Results   Component Value Date    GFRNAA 43 (L) 06/18/2021                 Passed - GFR in the past 12 months           CLOPIDOGREL 75 MG Oral Tab [Pharmacy Med Name: Clopidogrel Bisulfate Oral Tablet 75 MG] 90 tablet 0     Sig: TAKE 1 TABLET BY MOUTH DAILY        There is no refill protocol information for this order        atorvastatin 40 MG Oral Tab [Pharmacy Med Name: Atorvastatin Calcium Oral Tablet 40 MG] 90 tablet 1     Sig: Take 1 tablet (40 mg total) by mouth nightly.         Cholesterol Medication Protocol Passed - 3/14/2022  9:54 PM        Passed - ALT in past 12 months        Passed - LDL in past 12 months        Passed - Last ALT < 80       Lab Results   Component Value Date    ALT 20 07/06/2021             Passed - Last LDL < 130     Lab Results   Component Value Date    LDL 49 07/06/2021               Passed - Appointment in past 12 or next 3 months

## 2022-04-08 ENCOUNTER — OFFICE VISIT (OUTPATIENT)
Dept: FAMILY MEDICINE CLINIC | Facility: CLINIC | Age: 77
End: 2022-04-08
Payer: MEDICARE

## 2022-04-08 ENCOUNTER — LAB ENCOUNTER (OUTPATIENT)
Dept: LAB | Age: 77
End: 2022-04-08
Attending: FAMILY MEDICINE
Payer: MEDICARE

## 2022-04-08 VITALS
HEART RATE: 69 BPM | DIASTOLIC BLOOD PRESSURE: 86 MMHG | HEIGHT: 67 IN | WEIGHT: 154 LBS | SYSTOLIC BLOOD PRESSURE: 142 MMHG | BODY MASS INDEX: 24.17 KG/M2

## 2022-04-08 DIAGNOSIS — E13.69 OTHER SPECIFIED DIABETES MELLITUS WITH OTHER SPECIFIED COMPLICATION, UNSPECIFIED WHETHER LONG TERM INSULIN USE (HCC): ICD-10-CM

## 2022-04-08 DIAGNOSIS — N28.9 RENAL INSUFFICIENCY: ICD-10-CM

## 2022-04-08 DIAGNOSIS — N28.9 RENAL INSUFFICIENCY: Primary | ICD-10-CM

## 2022-04-08 LAB
ALBUMIN SERPL-MCNC: 4.1 G/DL (ref 3.4–5)
ALBUMIN/GLOB SERPL: 1.3 {RATIO} (ref 1–2)
ALP LIVER SERPL-CCNC: 94 U/L
ALT SERPL-CCNC: 22 U/L
ANION GAP SERPL CALC-SCNC: 7 MMOL/L (ref 0–18)
AST SERPL-CCNC: 19 U/L (ref 15–37)
BILIRUB SERPL-MCNC: 0.7 MG/DL (ref 0.1–2)
BUN BLD-MCNC: 31 MG/DL (ref 7–18)
BUN/CREAT SERPL: 19.3 (ref 10–20)
CALCIUM BLD-MCNC: 9.3 MG/DL (ref 8.5–10.1)
CARTRIDGE EXPIRATION DATE: ABNORMAL DATE
CHLORIDE SERPL-SCNC: 107 MMOL/L (ref 98–112)
CO2 SERPL-SCNC: 24 MMOL/L (ref 21–32)
CREAT BLD-MCNC: 1.61 MG/DL
FASTING STATUS PATIENT QL REPORTED: YES
GLOBULIN PLAS-MCNC: 3.1 G/DL (ref 2.8–4.4)
GLUCOSE BLD-MCNC: 108 MG/DL (ref 70–99)
HEMOGLOBIN A1C: 7.2 % (ref 4.3–5.6)
OSMOLALITY SERPL CALC.SUM OF ELEC: 293 MOSM/KG (ref 275–295)
POTASSIUM SERPL-SCNC: 4.9 MMOL/L (ref 3.5–5.1)
PROT SERPL-MCNC: 7.2 G/DL (ref 6.4–8.2)
SODIUM SERPL-SCNC: 138 MMOL/L (ref 136–145)

## 2022-04-08 PROCEDURE — 83036 HEMOGLOBIN GLYCOSYLATED A1C: CPT | Performed by: FAMILY MEDICINE

## 2022-04-08 PROCEDURE — 99214 OFFICE O/P EST MOD 30 MIN: CPT | Performed by: FAMILY MEDICINE

## 2022-04-08 PROCEDURE — 36415 COLL VENOUS BLD VENIPUNCTURE: CPT

## 2022-04-08 PROCEDURE — 80053 COMPREHEN METABOLIC PANEL: CPT

## 2022-04-08 RX ORDER — AMLODIPINE BESYLATE 5 MG/1
5 TABLET ORAL DAILY
Qty: 90 TABLET | Refills: 1 | Status: SHIPPED | OUTPATIENT
Start: 2022-04-08 | End: 2023-04-03

## 2022-04-08 RX ORDER — OLMESARTAN MEDOXOMIL 40 MG/1
40 TABLET ORAL DAILY
Qty: 90 TABLET | Refills: 1 | Status: SHIPPED | OUTPATIENT
Start: 2022-04-08

## 2022-04-15 ENCOUNTER — TELEPHONE (OUTPATIENT)
Dept: FAMILY MEDICINE CLINIC | Facility: CLINIC | Age: 77
End: 2022-04-15

## 2022-04-15 NOTE — TELEPHONE ENCOUNTER
Daughter, Christina Chappell, on LATASHA, would like lab results done on 4/8/22. Per Christina Chappell, they would like to know if there area any recommendations. Please advise. Christina Chappell was also informed, Dr. Juan Jose Zaragoza may send result notes to [address] Daughter verbalized understanding.

## 2022-05-14 NOTE — TELEPHONE ENCOUNTER
Please review; protocol failed/no protocol    Requested Prescriptions   Pending Prescriptions Disp Refills    METFORMIN HCL 1000 MG Oral Tab [Pharmacy Med Name: metFORMIN HCl Oral Tablet 1000 MG] 60 tablet 0     Sig: TAKE ONE TABLET BY MOUTH TWICE A DAY WITH MEALS        Diabetes Medication Protocol Failed - 5/13/2022 11:14 AM        Failed - GFR Non- > 50     Lab Results   Component Value Date    GFRNAA 41 (L) 04/08/2022                 Passed - Last A1C < 7.5 and within past 6 months     Lab Results   Component Value Date    A1C 7.2 (A) 04/08/2022               Passed - Appointment in past 6 or next 3 months        Passed - GFR in the past 12 months               Recent Outpatient Visits              1 month ago Renal insufficiency    Joselyn Ochoa MD    Office Visit    9 months ago Reactive depression    [address], [address] [facility]3], MD Aguilar    Office Visit    10 months ago Dyspnea, unspecified type    [facility]    [facility] Visit    11 months ago Colon stricture [facility])    Joselyn Ochoa MD    Office Visit    2 years ago Renal insufficiency    Joselyn Ochoa MD    Office Visit             Future Appointments         Provider Department Appt Notes    In 1 month Ortiz Ricketts MD [address], [address] [facility] kidney #s out of range

## 2022-05-28 RX ORDER — CLOPIDOGREL BISULFATE 75 MG/1
75 TABLET ORAL DAILY
Qty: 90 TABLET | Refills: 1 | Status: SHIPPED | OUTPATIENT
Start: 2022-05-28

## 2022-05-28 RX ORDER — HYDROCHLOROTHIAZIDE 12.5 MG/1
12.5 TABLET ORAL DAILY
Qty: 90 TABLET | Refills: 1 | Status: SHIPPED | OUTPATIENT
Start: 2022-05-28

## 2022-05-28 NOTE — TELEPHONE ENCOUNTER
Please review refill protocol failed/ no protocol  Requested Prescriptions   Pending Prescriptions Disp Refills    HYDROCHLOROTHIAZIDE 12.5 MG Oral Tab [Pharmacy Med Name: hydroCHLOROthiazide Oral Tablet 12.5 MG] 30 tablet 0     Sig: TAKE 1 TABLET BY MOUTH DAILY        Hypertensive Medications Protocol Failed - 5/27/2022  1:05 PM        Failed - GFR Non- > 50     Lab Results   Component Value Date    GFRNAA 41 (L) 04/08/2022                 Passed - CMP or BMP in past 12 months        Passed - Appointment in past 6 or next 3 months           CLOPIDOGREL 75 MG Oral Tab [Pharmacy Med Name: Clopidogrel Bisulfate Oral Tablet 75 MG] 90 tablet 0     Sig: TAKE 1 TABLET BY MOUTH DAILY        There is no refill protocol information for this order

## 2022-06-04 ENCOUNTER — TELEPHONE (OUTPATIENT)
Dept: FAMILY MEDICINE CLINIC | Facility: CLINIC | Age: 77
End: 2022-06-04

## 2022-06-04 ENCOUNTER — HOSPITAL ENCOUNTER (EMERGENCY)
Facility: HOSPITAL | Age: 77
Discharge: HOME | End: 2022-06-04
Attending: EMERGENCY MEDICINE
Payer: MEDICARE

## 2022-06-04 VITALS
WEIGHT: 150 LBS | HEIGHT: 67 IN | SYSTOLIC BLOOD PRESSURE: 139 MMHG | OXYGEN SATURATION: 100 % | DIASTOLIC BLOOD PRESSURE: 60 MMHG | TEMPERATURE: 98 F | RESPIRATION RATE: 18 BRPM | BODY MASS INDEX: 23.54 KG/M2 | HEART RATE: 82 BPM

## 2022-06-04 DIAGNOSIS — R19.7 DIARRHEA OF PRESUMED INFECTIOUS ORIGIN: Primary | ICD-10-CM

## 2022-06-04 LAB
ANION GAP SERPL CALC-SCNC: 9 MMOL/L (ref 0–18)
BASOPHILS # BLD AUTO: 0.05 X10(3) UL (ref 0–0.2)
BASOPHILS NFR BLD AUTO: 0.7 %
BUN BLD-MCNC: 34 MG/DL (ref 7–18)
BUN/CREAT SERPL: 17.5 (ref 10–20)
CALCIUM BLD-MCNC: 9.6 MG/DL (ref 8.5–10.1)
CHLORIDE SERPL-SCNC: 107 MMOL/L (ref 98–112)
CO2 SERPL-SCNC: 22 MMOL/L (ref 21–32)
CREAT BLD-MCNC: 1.94 MG/DL
DEPRECATED RDW RBC AUTO: 39.4 FL (ref 35.1–46.3)
EOSINOPHIL # BLD AUTO: 0.44 X10(3) UL (ref 0–0.7)
EOSINOPHIL NFR BLD AUTO: 6.1 %
ERYTHROCYTE [DISTWIDTH] IN BLOOD BY AUTOMATED COUNT: 12.1 % (ref 11–15)
GLUCOSE BLD-MCNC: 230 MG/DL (ref 70–99)
HCT VFR BLD AUTO: 38.7 %
HGB BLD-MCNC: 12.8 G/DL
IMM GRANULOCYTES # BLD AUTO: 0.02 X10(3) UL (ref 0–1)
IMM GRANULOCYTES NFR BLD: 0.3 %
LYMPHOCYTES # BLD AUTO: 2.22 X10(3) UL (ref 1–4)
LYMPHOCYTES NFR BLD AUTO: 30.8 %
MCH RBC QN AUTO: 29.4 PG (ref 26–34)
MCHC RBC AUTO-ENTMCNC: 33.1 G/DL (ref 31–37)
MCV RBC AUTO: 88.8 FL
MONOCYTES # BLD AUTO: 0.75 X10(3) UL (ref 0.1–1)
MONOCYTES NFR BLD AUTO: 10.4 %
NEUTROPHILS # BLD AUTO: 3.72 X10 (3) UL (ref 1.5–7.7)
NEUTROPHILS # BLD AUTO: 3.72 X10(3) UL (ref 1.5–7.7)
NEUTROPHILS NFR BLD AUTO: 51.7 %
OSMOLALITY SERPL CALC.SUM OF ELEC: 301 MOSM/KG (ref 275–295)
PLATELET # BLD AUTO: 157 10(3)UL (ref 150–450)
POTASSIUM SERPL-SCNC: 4 MMOL/L (ref 3.5–5.1)
RBC # BLD AUTO: 4.36 X10(6)UL
SODIUM SERPL-SCNC: 138 MMOL/L (ref 136–145)
WBC # BLD AUTO: 7.2 X10(3) UL (ref 4–11)

## 2022-06-04 PROCEDURE — 85025 COMPLETE CBC W/AUTO DIFF WBC: CPT | Performed by: EMERGENCY MEDICINE

## 2022-06-04 PROCEDURE — 99284 EMERGENCY DEPT VISIT MOD MDM: CPT

## 2022-06-04 PROCEDURE — 80048 BASIC METABOLIC PNL TOTAL CA: CPT | Performed by: EMERGENCY MEDICINE

## 2022-06-04 PROCEDURE — 82272 OCCULT BLD FECES 1-3 TESTS: CPT

## 2022-06-04 PROCEDURE — 36415 COLL VENOUS BLD VENIPUNCTURE: CPT

## 2022-06-04 RX ORDER — LOPERAMIDE HYDROCHLORIDE 2 MG/1
2 TABLET ORAL AS NEEDED
Qty: 20 TABLET | Refills: 0 | Status: SHIPPED | OUTPATIENT
Start: 2022-06-04 | End: 2022-06-04

## 2022-06-04 RX ORDER — LOPERAMIDE HYDROCHLORIDE 2 MG/1
2 TABLET ORAL AS NEEDED
Qty: 20 TABLET | Refills: 0 | Status: SHIPPED | OUTPATIENT
Start: 2022-06-04 | End: 2022-07-04

## 2022-06-04 NOTE — ED INITIAL ASSESSMENT (HPI)
Patient presents to ER with complaints of diarrhea x7 days. Patient notes today stool today was black. And states he is not feeling well, complaining of chills. Patient on plavix.

## 2022-06-06 NOTE — TELEPHONE ENCOUNTER
On call note: Was called on 6/4/22 by family member has had diarrhea and chills. After discussion, will send patient to the ER for further evaluation and treatment. Further evaluation and treatment in the ER. DTR verbalized understanding of recommendations and agrees to plan.

## 2022-06-06 NOTE — TELEPHONE ENCOUNTER
Message # [id number]         2022 01:02p   [MEGANT]  To:  From:  GARRICK Deal MD:  Phone#:  ----------------------------------------------------------------------  Sincere Herrera [phone]  45; ERNESTO Tiptond at number :  PAGE: [phone] at : RUB- 13:02

## 2022-08-08 ENCOUNTER — LAB ENCOUNTER (OUTPATIENT)
Dept: LAB | Age: 77
End: 2022-08-08
Attending: INTERNAL MEDICINE
Payer: MEDICARE

## 2022-08-08 DIAGNOSIS — Z95.0 CARDIAC PACEMAKER IN SITU: ICD-10-CM

## 2022-08-08 DIAGNOSIS — E11.9 DIABETES MELLITUS (HCC): ICD-10-CM

## 2022-08-08 DIAGNOSIS — E78.5 HYPERLIPEMIA: Primary | ICD-10-CM

## 2022-08-08 DIAGNOSIS — I10 ESSENTIAL HYPERTENSION, BENIGN: ICD-10-CM

## 2022-08-08 DIAGNOSIS — I25.10 CORONARY ATHEROSCLEROSIS OF NATIVE CORONARY ARTERY: ICD-10-CM

## 2022-08-08 LAB
ALBUMIN SERPL-MCNC: 3.9 G/DL (ref 3.4–5)
ALBUMIN/GLOB SERPL: 1.1 {RATIO} (ref 1–2)
ALP LIVER SERPL-CCNC: 100 U/L
ALT SERPL-CCNC: 19 U/L
ANION GAP SERPL CALC-SCNC: 10 MMOL/L (ref 0–18)
AST SERPL-CCNC: 21 U/L (ref 15–37)
BASOPHILS # BLD AUTO: 0.05 X10(3) UL (ref 0–0.2)
BASOPHILS NFR BLD AUTO: 0.7 %
BILIRUB SERPL-MCNC: 0.9 MG/DL (ref 0.1–2)
BUN BLD-MCNC: 31 MG/DL (ref 7–18)
BUN/CREAT SERPL: 18.2 (ref 10–20)
CALCIUM BLD-MCNC: 9.5 MG/DL (ref 8.5–10.1)
CHLORIDE SERPL-SCNC: 109 MMOL/L (ref 98–112)
CHOLEST SERPL-MCNC: 122 MG/DL (ref ?–200)
CO2 SERPL-SCNC: 21 MMOL/L (ref 21–32)
CREAT BLD-MCNC: 1.7 MG/DL
DEPRECATED RDW RBC AUTO: 40.3 FL (ref 35.1–46.3)
EOSINOPHIL # BLD AUTO: 0.35 X10(3) UL (ref 0–0.7)
EOSINOPHIL NFR BLD AUTO: 4.7 %
ERYTHROCYTE [DISTWIDTH] IN BLOOD BY AUTOMATED COUNT: 12.1 % (ref 11–15)
FASTING PATIENT LIPID ANSWER: YES
FASTING STATUS PATIENT QL REPORTED: YES
GFR SERPLBLD BASED ON 1.73 SQ M-ARVRAT: 41 ML/MIN/1.73M2 (ref 60–?)
GLOBULIN PLAS-MCNC: 3.5 G/DL (ref 2.8–4.4)
GLUCOSE BLD-MCNC: 122 MG/DL (ref 70–99)
HCT VFR BLD AUTO: 39.3 %
HDLC SERPL-MCNC: 34 MG/DL (ref 40–59)
HGB BLD-MCNC: 12.4 G/DL
IMM GRANULOCYTES # BLD AUTO: 0.04 X10(3) UL (ref 0–1)
IMM GRANULOCYTES NFR BLD: 0.5 %
LDLC SERPL CALC-MCNC: 58 MG/DL (ref ?–100)
LYMPHOCYTES # BLD AUTO: 2.09 X10(3) UL (ref 1–4)
LYMPHOCYTES NFR BLD AUTO: 28.1 %
MCH RBC QN AUTO: 28.8 PG (ref 26–34)
MCHC RBC AUTO-ENTMCNC: 31.6 G/DL (ref 31–37)
MCV RBC AUTO: 91.4 FL
MONOCYTES # BLD AUTO: 0.62 X10(3) UL (ref 0.1–1)
MONOCYTES NFR BLD AUTO: 8.3 %
NEUTROPHILS # BLD AUTO: 4.28 X10 (3) UL (ref 1.5–7.7)
NEUTROPHILS # BLD AUTO: 4.28 X10(3) UL (ref 1.5–7.7)
NEUTROPHILS NFR BLD AUTO: 57.7 %
NONHDLC SERPL-MCNC: 88 MG/DL (ref ?–130)
OSMOLALITY SERPL CALC.SUM OF ELEC: 298 MOSM/KG (ref 275–295)
PLATELET # BLD AUTO: 209 10(3)UL (ref 150–450)
POTASSIUM SERPL-SCNC: 4.3 MMOL/L (ref 3.5–5.1)
PROT SERPL-MCNC: 7.4 G/DL (ref 6.4–8.2)
RBC # BLD AUTO: 4.3 X10(6)UL
SODIUM SERPL-SCNC: 140 MMOL/L (ref 136–145)
TRIGL SERPL-MCNC: 182 MG/DL (ref 30–149)
VLDLC SERPL CALC-MCNC: 27 MG/DL (ref 0–30)
WBC # BLD AUTO: 7.4 X10(3) UL (ref 4–11)

## 2022-08-08 PROCEDURE — 85025 COMPLETE CBC W/AUTO DIFF WBC: CPT

## 2022-08-08 PROCEDURE — 80053 COMPREHEN METABOLIC PANEL: CPT

## 2022-08-08 PROCEDURE — 36415 COLL VENOUS BLD VENIPUNCTURE: CPT

## 2022-08-08 PROCEDURE — 80061 LIPID PANEL: CPT

## 2022-09-01 NOTE — TELEPHONE ENCOUNTER
Please review; protocol failed/no protocol.      Requested Prescriptions   Pending Prescriptions Disp Refills    OLMESARTAN MEDOXOMIL 40 MG Oral Tab [Pharmacy Med Name: Olmesartan Medoxomil Oral Tablet 40 MG] 90 tablet 0     Sig: TAKE 1 TABLET BY MOUTH DAILY        Hypertensive Medications Protocol Failed - 9/1/2022  5:25 PM        Failed - GFR > 50     Lab Results   Component Value Date    EGFRCR 41 (L) 08/08/2022                 Passed - In person appointment in the past 12 or next 3 months       Recent Outpatient Visits              4 months ago Renal insufficiency    Erik Browning MD    Office Visit    1 year ago Reactive depression    Erik Browning MD    Office Visit    1 year ago Dyspnea, unspecified type    [facility] MYRA Ayala    Office Visit    1 year ago Colon stricture [facility])    Erik Browning MD    Office Visit    2 years ago Renal insufficiency    [facility], Sarina Stroud MD    Office Visit                 Passed - Last BP reading less than 140/90     BP Readings from Last 1 Encounters:  06/04/22 : 139/60                Passed - CMP or BMP in past 6 months     Recent Results (from the past 4392 hour(s))   COMP METABOLIC PANEL (14)    Collection Time: 08/08/22 10:01 AM   Result Value Ref Range    Glucose 122 (H) 70 - 99 mg/dL    Sodium 140 136 - 145 mmol/L    Potassium 4.3 3.5 - 5.1 mmol/L    Chloride 109 98 - 112 mmol/L    CO2 21.0 21.0 - 32.0 mmol/L    Anion Gap 10 0 - 18 mmol/L    BUN 31 (H) 7 - 18 mg/dL    Creatinine 1.70 (H) 0.70 - 1.30 mg/dL    BUN/CREA Ratio 18.2 10.0 - 20.0    Calcium, Total 9.5 8.5 - 10.1 mg/dL    Calculated Osmolality 298 (H) 275 - 295 mOsm/kg    eGFR-Cr 41 (L) >=60 mL/min/1.73m2    ALT 19 16 - 61 U/L    AST 21 15 - 37 U/L    Alkaline Phosphatase 100 45 - 117 U/L    Bilirubin, Total 0.9 0.1 - 2.0 mg/dL    Total Protein 7.4 6.4 - 8.2 g/dL    Albumin 3.9 3.4 - 5.0 g/dL    Globulin  3.5 2.8 - 4.4 g/dL    A/G Ratio 1.1 1.0 - 2.0    Patient Fasting for CMP? Yes      *Note: Due to a large number of results and/or encounters for the requested time period, some results have not been displayed. A complete set of results can be found in Results Review.                  Passed - In person appointment or virtual visit in the past 6 months       Recent Outpatient Visits              4 months ago Renal insufficiency    David Crawford MD    Office Visit    1 year ago Reactive depression    Roc Ferro MD    Office Visit    1 year ago Dyspnea, unspecified type    [facility] MYRA Eli    Office Visit    1 year ago Colon stricture [facility])    [place] [employer] [place] [employer] [facility], [address] Tisha Hernandez MD    Office Visit    2 years ago Renal insufficiency    [facility], [address] Tisha Hernandez MD    Office Visit                    AMLODIPINE 5 MG Oral Tab [place] Med Name: amLODIPine Besylate Oral Tablet 5 MG] 90 tablet 0     Sig: TAKE 1 TABLET BY MOUTH DAILY        Hypertensive Medications Protocol Failed - 9/1/2022  5:25 PM        Failed - GFR > 50     Lab Results   Component Value Date    EGFRCR 41 (L) 08/08/2022                 Passed - In person appointment in the past 12 or next 3 months       Recent Outpatient Visits              4 months ago Renal insufficiency    Roc Ferro MD    Office Visit    1 year ago Reactive depression    Roc Ferro MD    Office Visit    1 year ago Dyspnea, unspecified type    [facility] MYRA Eli    Office Visit    1 year ago Colon stricture [facility])    [facility]    Office Visit    2 years ago Renal insufficiency    Harpal Moore MD    Office Visit                 Passed - Last BP reading less than 140/90     BP Readings from Last 1 Encounters:  06/04/22 : 139/60                Passed - CMP or BMP in past 6 months     Recent Results (from the past 4392 hour(s))   COMP METABOLIC PANEL (14)    Collection Time: 08/08/22 10:01 AM   Result Value Ref Range    Glucose 122 (H) 70 - 99 mg/dL    Sodium 140 136 - 145 mmol/L    Potassium 4.3 3.5 - 5.1 mmol/L    Chloride 109 98 - 112 mmol/L    CO2 21.0 21.0 - 32.0 mmol/L    Anion Gap 10 0 - 18 mmol/L    BUN 31 (H) 7 - 18 mg/dL    Creatinine 1.70 (H) 0.70 - 1.30 mg/dL    BUN/CREA Ratio 18.2 10.0 - 20.0    Calcium, Total 9.5 8.5 - 10.1 mg/dL    Calculated Osmolality 298 (H) 275 - 295 mOsm/kg    eGFR-Cr 41 (L) >=60 mL/min/1.73m2    ALT 19 16 - 61 U/L    AST 21 15 - 37 U/L    Alkaline Phosphatase 100 45 - 117 U/L    Bilirubin, Total 0.9 0.1 - 2.0 mg/dL    Total Protein 7.4 6.4 - 8.2 g/dL    Albumin 3.9 3.4 - 5.0 g/dL    Globulin  3.5 2.8 - 4.4 g/dL    A/G Ratio 1.1 1.0 - 2.0    Patient Fasting for CMP? Yes      *Note: Due to a large number of results and/or encounters for the requested time period, some results have not been displayed. A complete set of results can be found in Results Review.                  Passed - In person appointment or virtual visit in the past 6 months       Recent Outpatient Visits              4 months ago Renal insufficiency    Harpal Moore MD    Office Visit    1 year ago Reactive depression    Harpal Moore MD    Office Visit    1 year ago Dyspnea, unspecified type    [facility] MY[facility] Rodriguez    Office Visit    1 year ago Colon stricture [facility])    Harpal Moore MD    Office Visit    2 years ago Renal insufficiency    Maximiliano Teixeira MD    Office Visit                        Recent Outpatient Visits              4 months ago Renal insufficiency    Maximiliano Teixeira MD    Office Visit    1 year ago Reactive depression    Maximiliano Teixeira MD    Office Visit    1 year ago Dyspnea, unspecified type    [facility] MYRA Hart    Office Visit    1 year ago Colon stricture [facility])    Maximiliano Teixeira MD    Office Visit    2 years ago Renal insufficiency    Maximiliano Teixeira MD    Office Visit

## 2022-09-05 RX ORDER — AMLODIPINE BESYLATE 5 MG/1
5 TABLET ORAL DAILY
Qty: 90 TABLET | Refills: 1 | Status: SHIPPED | OUTPATIENT
Start: 2022-09-05

## 2022-09-05 RX ORDER — OLMESARTAN MEDOXOMIL 40 MG/1
40 TABLET ORAL DAILY
Qty: 90 TABLET | Refills: 1 | Status: SHIPPED | OUTPATIENT
Start: 2022-09-05

## 2022-09-07 ENCOUNTER — MED REC SCAN ONLY (OUTPATIENT)
Dept: FAMILY MEDICINE CLINIC | Facility: CLINIC | Age: 77
End: 2022-09-07

## 2022-09-19 ENCOUNTER — MED REC SCAN ONLY (OUTPATIENT)
Dept: FAMILY MEDICINE CLINIC | Facility: CLINIC | Age: 77
End: 2022-09-19

## 2023-03-30 ENCOUNTER — LAB ENCOUNTER (OUTPATIENT)
Dept: LAB | Facility: HOSPITAL | Age: 78
End: 2023-03-30
Attending: INTERNAL MEDICINE
Payer: MEDICARE

## 2023-03-30 DIAGNOSIS — I49.5 SICK SINUS SYNDROME (HCC): ICD-10-CM

## 2023-03-30 DIAGNOSIS — E78.5 HYPERLIPIDEMIA: ICD-10-CM

## 2023-03-30 DIAGNOSIS — I25.10 CORONARY ARTERY DISEASE: Primary | ICD-10-CM

## 2023-03-30 LAB
ALBUMIN SERPL-MCNC: 3.7 G/DL (ref 3.4–5)
ALBUMIN/GLOB SERPL: 1.1 {RATIO} (ref 1–2)
ALP LIVER SERPL-CCNC: 104 U/L
ALT SERPL-CCNC: 25 U/L
ANION GAP SERPL CALC-SCNC: 5 MMOL/L (ref 0–18)
AST SERPL-CCNC: 20 U/L (ref 15–37)
BASOPHILS # BLD AUTO: 0.03 X10(3) UL (ref 0–0.2)
BASOPHILS NFR BLD AUTO: 0.4 %
BILIRUB SERPL-MCNC: 0.7 MG/DL (ref 0.1–2)
BUN BLD-MCNC: 35 MG/DL (ref 7–18)
BUN/CREAT SERPL: 16.6 (ref 10–20)
CALCIUM BLD-MCNC: 9.1 MG/DL (ref 8.5–10.1)
CHLORIDE SERPL-SCNC: 110 MMOL/L (ref 98–112)
CHOLEST SERPL-MCNC: 109 MG/DL (ref ?–200)
CO2 SERPL-SCNC: 25 MMOL/L (ref 21–32)
CREAT BLD-MCNC: 2.11 MG/DL
DEPRECATED RDW RBC AUTO: 41.7 FL (ref 35.1–46.3)
EOSINOPHIL # BLD AUTO: 0.43 X10(3) UL (ref 0–0.7)
EOSINOPHIL NFR BLD AUTO: 5.7 %
ERYTHROCYTE [DISTWIDTH] IN BLOOD BY AUTOMATED COUNT: 12.7 % (ref 11–15)
FASTING PATIENT LIPID ANSWER: YES
FASTING STATUS PATIENT QL REPORTED: YES
GFR SERPLBLD BASED ON 1.73 SQ M-ARVRAT: 32 ML/MIN/1.73M2 (ref 60–?)
GLOBULIN PLAS-MCNC: 3.5 G/DL (ref 2.8–4.4)
GLUCOSE BLD-MCNC: 163 MG/DL (ref 70–99)
HCT VFR BLD AUTO: 36.7 %
HDLC SERPL-MCNC: 48 MG/DL (ref 40–59)
HGB BLD-MCNC: 11.6 G/DL
IMM GRANULOCYTES # BLD AUTO: 0.04 X10(3) UL (ref 0–1)
IMM GRANULOCYTES NFR BLD: 0.5 %
LDLC SERPL CALC-MCNC: 38 MG/DL (ref ?–100)
LYMPHOCYTES # BLD AUTO: 1.8 X10(3) UL (ref 1–4)
LYMPHOCYTES NFR BLD AUTO: 23.8 %
MCH RBC QN AUTO: 28.8 PG (ref 26–34)
MCHC RBC AUTO-ENTMCNC: 31.6 G/DL (ref 31–37)
MCV RBC AUTO: 91.1 FL
MONOCYTES # BLD AUTO: 0.7 X10(3) UL (ref 0.1–1)
MONOCYTES NFR BLD AUTO: 9.3 %
NEUTROPHILS # BLD AUTO: 4.56 X10 (3) UL (ref 1.5–7.7)
NEUTROPHILS # BLD AUTO: 4.56 X10(3) UL (ref 1.5–7.7)
NEUTROPHILS NFR BLD AUTO: 60.3 %
NONHDLC SERPL-MCNC: 61 MG/DL (ref ?–130)
OSMOLALITY SERPL CALC.SUM OF ELEC: 302 MOSM/KG (ref 275–295)
PLATELET # BLD AUTO: 181 10(3)UL (ref 150–450)
POTASSIUM SERPL-SCNC: 4.9 MMOL/L (ref 3.5–5.1)
PROT SERPL-MCNC: 7.2 G/DL (ref 6.4–8.2)
RBC # BLD AUTO: 4.03 X10(6)UL
SODIUM SERPL-SCNC: 140 MMOL/L (ref 136–145)
TRIGL SERPL-MCNC: 130 MG/DL (ref 30–149)
VLDLC SERPL CALC-MCNC: 18 MG/DL (ref 0–30)
WBC # BLD AUTO: 7.6 X10(3) UL (ref 4–11)

## 2023-03-30 PROCEDURE — 36415 COLL VENOUS BLD VENIPUNCTURE: CPT

## 2023-03-30 PROCEDURE — 85025 COMPLETE CBC W/AUTO DIFF WBC: CPT

## 2023-03-30 PROCEDURE — 80061 LIPID PANEL: CPT

## 2023-03-30 PROCEDURE — 80053 COMPREHEN METABOLIC PANEL: CPT

## 2023-04-18 ENCOUNTER — OFFICE VISIT (OUTPATIENT)
Dept: FAMILY MEDICINE CLINIC | Facility: CLINIC | Age: 78
End: 2023-04-18

## 2023-04-18 VITALS
HEART RATE: 81 BPM | DIASTOLIC BLOOD PRESSURE: 70 MMHG | WEIGHT: 156 LBS | RESPIRATION RATE: 16 BRPM | OXYGEN SATURATION: 97 % | SYSTOLIC BLOOD PRESSURE: 134 MMHG | BODY MASS INDEX: 24.48 KG/M2 | HEIGHT: 67 IN

## 2023-04-18 DIAGNOSIS — R26.9 GAIT DISORDER: ICD-10-CM

## 2023-04-18 DIAGNOSIS — E11.9 DIABETES MELLITUS TYPE 2 IN NONOBESE (HCC): ICD-10-CM

## 2023-04-18 DIAGNOSIS — N28.9 RENAL INSUFFICIENCY: Primary | ICD-10-CM

## 2023-04-18 DIAGNOSIS — D64.9 ANEMIA, UNSPECIFIED TYPE: ICD-10-CM

## 2023-04-18 LAB
CARTRIDGE LOT#: ABNORMAL NUMERIC
HEMOGLOBIN A1C: 8.6 % (ref 4.3–5.6)

## 2023-04-18 PROCEDURE — 1125F AMNT PAIN NOTED PAIN PRSNT: CPT | Performed by: FAMILY MEDICINE

## 2023-04-18 PROCEDURE — 83036 HEMOGLOBIN GLYCOSYLATED A1C: CPT | Performed by: FAMILY MEDICINE

## 2023-04-18 PROCEDURE — 99214 OFFICE O/P EST MOD 30 MIN: CPT | Performed by: FAMILY MEDICINE

## 2023-04-18 RX ORDER — EMPAGLIFLOZIN 25 MG/1
25 TABLET, FILM COATED ORAL DAILY
Qty: 90 TABLET | Refills: 1 | Status: SHIPPED | OUTPATIENT
Start: 2023-04-25 | End: 2023-07-24

## 2023-04-19 LAB
CREAT UR-SCNC: 52 MG/DL
MICROALBUMIN UR-MCNC: 2.42 MG/DL
MICROALBUMIN/CREAT 24H UR-RTO: 46.5 UG/MG (ref ?–30)

## 2023-04-19 NOTE — TELEPHONE ENCOUNTER
Patient is requesting medication refills on all medication for 3 months. Patient mentions he is traveling and will need a 3 month supply. Please advise. If any questions please call loren's phone # .

## 2023-04-20 RX ORDER — OLMESARTAN MEDOXOMIL 40 MG/1
40 TABLET ORAL DAILY
Qty: 90 TABLET | Refills: 1 | Status: SHIPPED | OUTPATIENT
Start: 2023-04-20

## 2023-04-20 RX ORDER — CLOPIDOGREL BISULFATE 75 MG/1
75 TABLET ORAL DAILY
Qty: 90 TABLET | Refills: 1 | Status: SHIPPED | OUTPATIENT
Start: 2023-04-20

## 2023-04-20 RX ORDER — ATORVASTATIN CALCIUM 80 MG/1
80 TABLET, FILM COATED ORAL NIGHTLY
Qty: 90 TABLET | Refills: 3 | Status: SHIPPED | OUTPATIENT
Start: 2023-04-20

## 2023-04-20 RX ORDER — HYDROCHLOROTHIAZIDE 12.5 MG/1
12.5 TABLET ORAL DAILY
Qty: 90 TABLET | Refills: 1 | Status: SHIPPED | OUTPATIENT
Start: 2023-04-20

## 2023-04-20 RX ORDER — AMLODIPINE BESYLATE 5 MG/1
5 TABLET ORAL DAILY
Qty: 90 TABLET | Refills: 1 | Status: SHIPPED | OUTPATIENT
Start: 2023-04-20

## 2023-04-20 RX ORDER — METOPROLOL SUCCINATE 100 MG/1
100 TABLET, EXTENDED RELEASE ORAL DAILY
Qty: 90 TABLET | Refills: 3 | Status: SHIPPED | OUTPATIENT
Start: 2023-04-20

## 2023-04-20 NOTE — TELEPHONE ENCOUNTER
Dr. Alexandra Benitez -   *Atorvastatin previously ordered by another provider , despite passing protocol. Please Review. Protocol Failed or has no protocol. Requested Prescriptions   Pending Prescriptions Disp Refills    Olmesartan Medoxomil 40 MG Oral Tab 90 tablet 1     Sig: Take 1 tablet (40 mg total) by mouth daily.        Hypertensive Medications Protocol Failed - 4/19/2023  3:19 PM        Failed - EGFRCR or GFRNAA > 50     GFR Evaluation  EGFRCR: 32 , resulted on 3/30/2023            Passed - In person appointment in the past 12 or next 3 months     Recent Outpatient Visits              2 days ago Renal insufficiency    Faith Botello MD    Office Visit    1 year ago Renal insufficiency    Jonas Merchant MD    Office Visit    1 year ago Reactive depression    Jonas Merchant MD    Office Visit    1 year ago Dyspnea, unspecified type    [facility], [address] [place] [address] MYRA Smiley    Office Visit    1 year ago Colon stricture [facility])    Paul Christensen Green[place]ito MD    Office Visit                      Passed - Last BP reading less than 140/90     BP Readings from Last 1 Encounters:  04/18/23 : 134/70                Passed - CMP or BMP in past 6 months     Recent Results (from the past 4392 hour(s))   COMP METABOLIC PANEL (14)    Collection Time: 03/30/23  9:32 AM   Result Value Ref Range    Glucose 163 (H) 70 - 99 mg/dL    Sodium 140 136 - 145 mmol/L    Potassium 4.9 3.5 - 5.1 mmol/L    Chloride 110 98 - 112 mmol/L    CO2 25.0 21.0 - 32.0 mmol/L    Anion Gap 5 0 - 18 mmol/L    BUN 35 (H) 7 - 18 mg/dL    Creatinine 2.11 (H) 0.70 - 1.30 mg/dL    BUN/CREA Ratio 16.6 10.0 - 20.0    Calcium, Total 9.1 8.5 - 10.1 mg/dL    Calculated Osmolality 302 (H) 275 - 295 mOsm/kg eGFR-Cr 32 (L) >=60 mL/min/1.73m2    ALT 25 16 - 61 U/L    AST 20 15 - 37 U/L    Alkaline Phosphatase 104 45 - 117 U/L    Bilirubin, Total 0.7 0.1 - 2.0 mg/dL    Total Protein 7.2 6.4 - 8.2 g/dL    Albumin 3.7 3.4 - 5.0 g/dL    Globulin  3.5 2.8 - 4.4 g/dL    A/G Ratio 1.1 1.0 - 2.0    Patient Fasting for CMP? Yes      *Note: Due to a large number of results and/or encounters for the requested time period, some results have not been displayed. A complete set of results can be found in Results Review. Passed - In person appointment or virtual visit in the past 6 months     Recent Outpatient Visits              2 days ago Renal insufficiency    Kathy Dodge MD    Office Visit    1 year ago Renal insufficiency    Kathy Dodge MD    Office Visit    1 year ago Reactive depression    Kathy Dodge MD    Office Visit    1 year ago Dyspnea, unspecified type    David Goncalves APRN    Office Visit    1 year ago Colon stricture [facility])    Kathy Dodge MD    Office Visit                        metoprolol succinate  MG Oral Tablet 24 Hr 90 tablet 3     Sig: Take 1 tablet (100 mg total) by mouth daily.        Hypertensive Medications Protocol Failed - 4/19/2023  3:19 PM        Failed - EGFRCR or GFRNAA > 50     GFR Evaluation  EGFRCR: 32 , resulted on 3/30/2023            Passed - In person appointment in the past 12 or next 3 months     Recent Outpatient Visits              2 days ago Renal insufficiency    Kathy Dodge MD    Office Visit    1 year ago Renal insufficiency    Kathy Dodge MD Office Visit    1 year ago Reactive depression    Lucina Johnston MD    Office Visit    1 year ago Dyspnea, unspecified type    [facility], [address] [facility] MYRA Flores    Office Visit    1 year ago Colon stricture [facility])    Sonja Maher, [place], Rustam Herrera MD    Office Visit                      Passed - Last BP reading less than 140/90     BP Readings from Last 1 Encounters:  04/18/23 : 134/70                Passed - CMP or BMP in past 6 months     Recent Results (from the past 4392 hour(s))   COMP METABOLIC PANEL (14)    Collection Time: 03/30/23  9:32 AM   Result Value Ref Range    Glucose 163 (H) 70 - 99 mg/dL    Sodium 140 136 - 145 mmol/L    Potassium 4.9 3.5 - 5.1 mmol/L    Chloride 110 98 - 112 mmol/L    CO2 25.0 21.0 - 32.0 mmol/L    Anion Gap 5 0 - 18 mmol/L    BUN 35 (H) 7 - 18 mg/dL    Creatinine 2.11 (H) 0.70 - 1.30 mg/dL    BUN/CREA Ratio 16.6 10.0 - 20.0    Calcium, Total 9.1 8.5 - 10.1 mg/dL    Calculated Osmolality 302 (H) 275 - 295 mOsm/kg    eGFR-Cr 32 (L) >=60 mL/min/1.73m2    ALT 25 16 - 61 U/L    AST 20 15 - 37 U/L    Alkaline Phosphatase 104 45 - 117 U/L    Bilirubin, Total 0.7 0.1 - 2.0 mg/dL    Total Protein 7.2 6.4 - 8.2 g/dL    Albumin 3.7 3.4 - 5.0 g/dL    Globulin  3.5 2.8 - 4.4 g/dL    A/G Ratio 1.1 1.0 - 2.0    Patient Fasting for CMP? Yes      *Note: Due to a large number of results and/or encounters for the requested time period, some results have not been displayed. A complete set of results can be found in Results Review.                  Passed - In person appointment or virtual visit in the past 6 months     Recent Outpatient Visits              2 days ago Renal insufficiency    Lucina Johnston MD    Office Visit    1 year ago Renal insufficiency    [facility] [place] Deedee Johnson MD    Office Visit    1 year ago Reactive depression    Nette Song MD    Office Visit    1 year ago Dyspnea, unspecified type    [facility]    Office Visit    1 year ago Colon stricture [facility])    Nette Song MD    Office Visit                        metFORMIN HCl 1000 MG Oral Tab 180 tablet 3     Sig: Take 1 tablet (1,000 mg total) by mouth 2 (two) times daily with meals. Diabetes Medication Protocol Failed - 4/19/2023  3:19 PM        Failed - Last A1C < 7.5 and within past 6 months     Lab Results   Component Value Date    A1C 8.6 (A) 04/18/2023               Failed - EGFRCR or GFRNAA > 50     GFR Evaluation  EGFRCR: 32 , resulted on 3/30/2023            Passed - In person appointment or virtual visit in the past 6 mos or appointment in next 3 mos     Recent Outpatient Visits              2 days ago Renal insufficiency    Nette Song MD    Office Visit    1 year ago Renal insufficiency    Nette Song MD    Office Visit    1 year ago Reactive depression    Nette Song MD    Office Visit    1 year ago Dyspnea, unspecified type    [facility], [facility]    Office Visit    1 year ago Colon stricture [facility])    Nette Song MD    Office Visit                      Passed - GFR in the past 12 months          hydroCHLOROthiazide 12.5 MG Oral Tab 90 tablet 1     Sig: Take 1 tablet (12.5 mg total) by mouth daily.        Hypertensive Medications Protocol Failed - 4/19/2023  3:19 PM        Failed - EGFRCR or GFRNAA > 50     GFR Evaluation  EGFRCR: 32 , resulted on 3/30/2023            Passed - In person appointment in the past 12 or next 3 months     Recent Outpatient Visits              2 days ago Renal insufficiency    Edelmira Rivas MD    Office Visit    1 year ago Renal insufficiency    Philip Dye MD    Office Visit    1 year ago Reactive depression    Philip Dye MD    Office Visit    1 year ago Dyspnea, unspecified type    [facility], [address] [place], [place] Silvia Ricardo, APRN    Office Visit    1 year ago Colon stricture [facility])    David Dye Lossie Scotts, MD    Office Visit                      Passed - Last BP reading less than 140/90     BP Readings from Last 1 Encounters:  04/18/23 : 134/70                Passed - CMP or BMP in past 6 months     Recent Results (from the past 4392 hour(s))   COMP METABOLIC PANEL (14)    Collection Time: 03/30/23  9:32 AM   Result Value Ref Range    Glucose 163 (H) 70 - 99 mg/dL    Sodium 140 136 - 145 mmol/L    Potassium 4.9 3.5 - 5.1 mmol/L    Chloride 110 98 - 112 mmol/L    CO2 25.0 21.0 - 32.0 mmol/L    Anion Gap 5 0 - 18 mmol/L    BUN 35 (H) 7 - 18 mg/dL    Creatinine 2.11 (H) 0.70 - 1.30 mg/dL    BUN/CREA Ratio 16.6 10.0 - 20.0    Calcium, Total 9.1 8.5 - 10.1 mg/dL    Calculated Osmolality 302 (H) 275 - 295 mOsm/kg    eGFR-Cr 32 (L) >=60 mL/min/1.73m2    ALT 25 16 - 61 U/L    AST 20 15 - 37 U/L    Alkaline Phosphatase 104 45 - 117 U/L    Bilirubin, Total 0.7 0.1 - 2.0 mg/dL    Total Protein 7.2 6.4 - 8.2 g/dL    Albumin 3.7 3.4 - 5.0 g/dL    Globulin  3.5 2.8 - 4.4 g/dL    A/G Ratio 1.1 1.0 - 2.0    Patient Fasting for CMP?  Yes      *Note: Due to a large number of results and/or encounters for the requested time period, some results have not been displayed. A complete set of results can be found in Results Review. Passed - In person appointment or virtual visit in the past 6 months     Recent Outpatient Visits              2 days ago Renal insufficiency    Heidi Grullon MD    Office Visit    1 year ago Renal insufficiency    Heidi Grullon MD    Office Visit    1 year ago Reactive depression    Heidi Grullon MD    Office Visit    1 year ago Dyspnea, unspecified type    [facility], [address] [place] [address] MYRA Gupta    Office Visit    1 year ago Colon stricture [facility])    Heidi Grullon MD    Office Visit                        clopidogrel 75 MG Oral Tab 90 tablet 1     Sig: Take 1 tablet (75 mg total) by mouth daily. There is no refill protocol information for this order       atorvastatin 80 MG Oral Tab 90 tablet 3     Sig: Take 1 tablet (80 mg total) by mouth nightly.        Cholesterol Medication Protocol Passed - 4/19/2023  3:19 PM        Passed - ALT in past 12 months        Passed - LDL in past 12 months        Passed - Last ALT < 80     Lab Results   Component Value Date    ALT 25 03/30/2023             Passed - Last LDL < 130     Lab Results   Component Value Date    LDL 38 03/30/2023               Passed - In person appointment or virtual visit in the past 12 mos or appointment in next 3 mos     Recent Outpatient Visits              2 days ago Renal insufficiency    Heidi Grullon MD    Office Visit    1 year ago Renal insufficiency    Heidi Grullon MD    Office Visit    1 year ago Reactive depression    [facility], Hugo Venegas MD    Office Visit    1 year ago Dyspnea, unspecified type    [facility] November, [place] Darcy Wu, APRN    Office Visit    1 year ago Colon stricture [facility])    Edil Juárez MD    Office Visit                        amLODIPine 5 MG Oral Tab 90 tablet 1     Sig: Take 1 tablet (5 mg total) by mouth daily.        Hypertensive Medications Protocol Failed - 4/19/2023  3:19 PM        Failed - EGFRCR or GFRNAA > 50     GFR Evaluation  EGFRCR: 32 , resulted on 3/30/2023            Passed - In person appointment in the past 12 or next 3 months     Recent Outpatient Visits              2 days ago Renal insufficiency    Edil Juárez MD    Office Visit    1 year ago Renal insufficiency    Edil Juárez MD    Office Visit    1 year ago Reactive depression    Edil Juárez MD    Office Visit    1 year ago Dyspnea, unspecified type    [facility], [address] [facility], [place] Darcy Wu, APRN    Office Visit    1 year ago Colon stricture [facility])    Edil Juárez MD    Office Visit                      Passed - Last BP reading less than 140/90     BP Readings from Last 1 Encounters:  04/18/23 : 134/70                Passed - CMP or BMP in past 6 months     Recent Results (from the past 4392 hour(s))   COMP METABOLIC PANEL (14)    Collection Time: 03/30/23  9:32 AM   Result Value Ref Range    Glucose 163 (H) 70 - 99 mg/dL    Sodium 140 136 - 145 mmol/L    Potassium 4.9 3.5 - 5.1 mmol/L    Chloride 110 98 - 112 mmol/L    CO2 25.0 21.0 - 32.0 mmol/L    Anion Gap 5 0 - 18 mmol/L    BUN 35 (H) 7 - 18 mg/dL    Creatinine 2.11 (H) 0.70 - 1.30 mg/dL BUN/CREA Ratio 16.6 10.0 - 20.0    Calcium, Total 9.1 8.5 - 10.1 mg/dL    Calculated Osmolality 302 (H) 275 - 295 mOsm/kg    eGFR-Cr 32 (L) >=60 mL/min/1.73m2    ALT 25 16 - 61 U/L    AST 20 15 - 37 U/L    Alkaline Phosphatase 104 45 - 117 U/L    Bilirubin, Total 0.7 0.1 - 2.0 mg/dL    Total Protein 7.2 6.4 - 8.2 g/dL    Albumin 3.7 3.4 - 5.0 g/dL    Globulin  3.5 2.8 - 4.4 g/dL    A/G Ratio 1.1 1.0 - 2.0    Patient Fasting for CMP? Yes      *Note: Due to a large number of results and/or encounters for the requested time period, some results have not been displayed. A complete set of results can be found in Results Review.                  Passed - In person appointment or virtual visit in the past 6 months     Recent Outpatient Visits              2 days ago Renal insufficiency    Lucina Johnston MD    Office Visit    1 year ago Renal insufficiency    Lucina Johnston MD    Office Visit    1 year ago Reactive depression    Luicna Johnston MD    Office Visit    1 year ago Dyspnea, unspecified type    [facility], [address] [facility] MYRA Flores    Office Visit    1 year ago Colon stricture [facility])    Lucina Johnston MD    Office Visit                         Recent Outpatient Visits              2 days ago Renal insufficiency    Lucina Johnston MD    Office Visit    1 year ago Renal insufficiency    Lucina Johnston MD    Office Visit    1 year ago Reactive depression    Lucina Johnston MD    Office Visit    1 year ago Dyspnea, unspecified type    [facility], [address] [place] [address] MYRA Maynard    Office Visit    1 year ago Colon stricture [facility])    Tanner Pryor MD    Office Visit

## 2023-08-19 NOTE — TELEPHONE ENCOUNTER
Pt's daughter Davion Oliveira returning the call. No LATASHA on file. She states every time pt discontinue taking the Iron , he gets diarrhea. Daughter is asking if he needs to continue to take the  Iron.   Chart reviewed and informed dtr to call ALONSO RENTERIA since she wa General

## 2023-10-26 ENCOUNTER — MED REC SCAN ONLY (OUTPATIENT)
Dept: FAMILY MEDICINE CLINIC | Facility: CLINIC | Age: 78
End: 2023-10-26

## 2024-03-11 NOTE — TELEPHONE ENCOUNTER
Please review.  Protocol failed / Has no protocol.     Oncovision message sent to patient to schedule an office visit with PCP.   Will also make a phone attempt.    Requested Prescriptions   Pending Prescriptions Disp Refills    OLMESARTAN MEDOXOMIL 40 MG Oral Tab [Pharmacy Med Name: Olmesartan Medoxomil Oral Tablet 40 MG] 90 tablet 0     Sig: TAKE 1 TABLET BY MOUTH DAILY       Hypertension Medications Protocol Failed - 3/9/2024  4:42 PM        Failed - EGFRCR or GFRNAA > 50     GFR Evaluation  EGFRCR: 32 , resulted on 3/30/2023          Passed - CMP or BMP in past 12 months        Passed - Last BP reading less than 140/90     BP Readings from Last 1 Encounters:   04/18/23 134/70               Passed - In person appointment or virtual visit in the past 12 mos or appointment in next 3 mos     Recent Out[facility]its              [facility]    [facility]    [facility]    2 years [facility]    [facility]    2 years [facility], [facility] type    [facility]    [facility]    2 years ago [facility]                        HYDROCHLOROTHIAZIDE 12.5 MG Oral Tab [Pharmacy Med Name: hydroCHLOROthiazide Oral Tablet 12.5 MG] 90 tablet 0     Sig: TAKE 1 TABLET BY MOUTH DAILY       Hypertension Medications Protocol Failed - 3/9/2024  4:42 PM        Failed - EGFRCR or GFRNAA > 50     GFR Evaluation  EGFRCR: 32 , resulted on 3/30/2023          Passed - CMP or BMP in past 12 months        Passed - Last BP reading less than 140/90     BP Readings from Last  1 Encounters:   04/18/23 134/70               Passed - In person appointment or virtual visit in the past 12 mos or appointment in next 3 mos     Recent [facility]its              [facility]    [facility]    1 year [facility]    [facility]    2 years [facility]    [facility]    2 years [facility], unspecified type    [facility]    [facility]    2 years ago [facility]    [facility]                        AMLODIPINE 5 MG Oral Tab [Pharmacy Med Name: amLODIPine Besylate Oral Tablet 5 MG] 90 tablet 0     Sig: TAKE 1 TABLET BY MOUTH DAILY       Hypertension Medications Protocol Failed - 3/9/2024  4:42 PM        Failed - EGFRCR or GFRNAA > 50     GFR Evaluation  EGFRCR: 32 , resulted on 3/30/2023          Passed - CMP or BMP in past 12 months        Passed - Last BP reading less than 140/90     BP Readings from Last 1 Encounters:   04/18/23 134/70               Passed - In person appointment or virtual visit in the past 12 mos or appointment in next 3 mos     Recent Outpatient Visits              10 months ago [facility]    [facility]    1 year [facility]    [facility]    2 years ago [facility]active depression    [facility]    [facility] Visit    2 years ago Dyspnea, unspecified type    [facility]  Marlene Mariano APRN    Office Visit    2 years ago Colon stricture (HCC)    [facility], Rebecca Alexander MD    Office Visit                        CLOPIDOGREL 75 MG Oral Tab [Pharmacy Med Name: Clopidogrel Bisulfate Oral Tablet 75 MG] 90 tablet 0     Sig: TAKE 1 TABLET BY MOUTH DAILY       There is no refill protocol information for this order

## 2024-03-12 RX ORDER — AMLODIPINE BESYLATE 5 MG/1
5 TABLET ORAL DAILY
Qty: 90 TABLET | Refills: 0 | Status: SHIPPED | OUTPATIENT
Start: 2024-03-12

## 2024-03-12 RX ORDER — CLOPIDOGREL BISULFATE 75 MG/1
75 TABLET ORAL DAILY
Qty: 90 TABLET | Refills: 0 | Status: SHIPPED | OUTPATIENT
Start: 2024-03-12

## 2024-03-12 RX ORDER — OLMESARTAN MEDOXOMIL 40 MG/1
40 TABLET ORAL DAILY
Qty: 90 TABLET | Refills: 0 | Status: SHIPPED | OUTPATIENT
Start: 2024-03-12

## 2024-03-12 RX ORDER — HYDROCHLOROTHIAZIDE 12.5 MG/1
12.5 TABLET ORAL DAILY
Qty: 90 TABLET | Refills: 0 | Status: SHIPPED | OUTPATIENT
Start: 2024-03-12

## 2024-06-18 ENCOUNTER — OFFICE VISIT (OUTPATIENT)
Dept: FAMILY MEDICINE CLINIC | Facility: CLINIC | Age: 79
End: 2024-06-18

## 2024-06-18 ENCOUNTER — LAB ENCOUNTER (OUTPATIENT)
Dept: LAB | Age: 79
End: 2024-06-18
Attending: FAMILY MEDICINE

## 2024-06-18 VITALS
BODY MASS INDEX: 23.39 KG/M2 | WEIGHT: 149 LBS | SYSTOLIC BLOOD PRESSURE: 110 MMHG | HEART RATE: 79 BPM | DIASTOLIC BLOOD PRESSURE: 70 MMHG | RESPIRATION RATE: 18 BRPM | HEIGHT: 67 IN

## 2024-06-18 DIAGNOSIS — N18.30 TYPE 2 DIABETES MELLITUS WITH STAGE 3 CHRONIC KIDNEY DISEASE, WITHOUT LONG-TERM CURRENT USE OF INSULIN, UNSPECIFIED WHETHER STAGE 3A OR 3B CKD (HCC): ICD-10-CM

## 2024-06-18 DIAGNOSIS — I25.10 CORONARY ARTERY DISEASE INVOLVING NATIVE CORONARY ARTERY OF NATIVE HEART WITHOUT ANGINA PECTORIS: Primary | ICD-10-CM

## 2024-06-18 DIAGNOSIS — M25.569 KNEE PAIN, UNSPECIFIED CHRONICITY, UNSPECIFIED LATERALITY: ICD-10-CM

## 2024-06-18 DIAGNOSIS — I10 ESSENTIAL HYPERTENSION: ICD-10-CM

## 2024-06-18 DIAGNOSIS — E11.22 TYPE 2 DIABETES MELLITUS WITH STAGE 3 CHRONIC KIDNEY DISEASE, WITHOUT LONG-TERM CURRENT USE OF INSULIN, UNSPECIFIED WHETHER STAGE 3A OR 3B CKD (HCC): ICD-10-CM

## 2024-06-18 DIAGNOSIS — R60.9 EDEMA, UNSPECIFIED TYPE: ICD-10-CM

## 2024-06-18 DIAGNOSIS — I25.10 CORONARY ARTERY DISEASE INVOLVING NATIVE CORONARY ARTERY OF NATIVE HEART WITHOUT ANGINA PECTORIS: ICD-10-CM

## 2024-06-18 DIAGNOSIS — Z00.00 ENCOUNTER FOR ANNUAL HEALTH EXAMINATION: ICD-10-CM

## 2024-06-18 LAB
ALBUMIN SERPL-MCNC: 4.5 G/DL (ref 3.2–4.8)
ALBUMIN/GLOB SERPL: 1.7 {RATIO} (ref 1–2)
ALP LIVER SERPL-CCNC: 117 U/L
ALT SERPL-CCNC: 32 U/L
ANION GAP SERPL CALC-SCNC: 7 MMOL/L (ref 0–18)
AST SERPL-CCNC: 22 U/L (ref ?–34)
BASOPHILS # BLD AUTO: 0.06 X10(3) UL (ref 0–0.2)
BASOPHILS NFR BLD AUTO: 0.8 %
BILIRUB SERPL-MCNC: 0.9 MG/DL (ref 0.2–1.1)
BUN BLD-MCNC: 28 MG/DL (ref 9–23)
BUN/CREAT SERPL: 16.5 (ref 10–20)
CALCIUM BLD-MCNC: 9.3 MG/DL (ref 8.7–10.4)
CHLORIDE SERPL-SCNC: 111 MMOL/L (ref 98–112)
CHOLEST SERPL-MCNC: 127 MG/DL (ref ?–200)
CO2 SERPL-SCNC: 23 MMOL/L (ref 21–32)
CREAT BLD-MCNC: 1.7 MG/DL
CREAT UR-SCNC: 68.4 MG/DL
DEPRECATED RDW RBC AUTO: 44.8 FL (ref 35.1–46.3)
EGFRCR SERPLBLD CKD-EPI 2021: 41 ML/MIN/1.73M2 (ref 60–?)
EOSINOPHIL # BLD AUTO: 0.55 X10(3) UL (ref 0–0.7)
EOSINOPHIL NFR BLD AUTO: 7.2 %
ERYTHROCYTE [DISTWIDTH] IN BLOOD BY AUTOMATED COUNT: 13.1 % (ref 11–15)
FASTING PATIENT LIPID ANSWER: NO
FASTING STATUS PATIENT QL REPORTED: NO
GLOBULIN PLAS-MCNC: 2.6 G/DL (ref 2–3.5)
GLUCOSE BLD-MCNC: 173 MG/DL (ref 70–99)
HCT VFR BLD AUTO: 40.4 %
HDLC SERPL-MCNC: 40 MG/DL (ref 40–59)
HEMOGLOBIN A1C: 6.5 % (ref 4.3–5.6)
HGB BLD-MCNC: 12.7 G/DL
IMM GRANULOCYTES # BLD AUTO: 0.02 X10(3) UL (ref 0–1)
IMM GRANULOCYTES NFR BLD: 0.3 %
LDLC SERPL CALC-MCNC: 62 MG/DL (ref ?–100)
LYMPHOCYTES # BLD AUTO: 2.26 X10(3) UL (ref 1–4)
LYMPHOCYTES NFR BLD AUTO: 29.4 %
MCH RBC QN AUTO: 29.3 PG (ref 26–34)
MCHC RBC AUTO-ENTMCNC: 31.4 G/DL (ref 31–37)
MCV RBC AUTO: 93.3 FL
MICROALBUMIN UR-MCNC: 4.5 MG/DL
MICROALBUMIN/CREAT 24H UR-RTO: 65.8 UG/MG (ref ?–30)
MONOCYTES # BLD AUTO: 0.63 X10(3) UL (ref 0.1–1)
MONOCYTES NFR BLD AUTO: 8.2 %
NEUTROPHILS # BLD AUTO: 4.17 X10 (3) UL (ref 1.5–7.7)
NEUTROPHILS # BLD AUTO: 4.17 X10(3) UL (ref 1.5–7.7)
NEUTROPHILS NFR BLD AUTO: 54.1 %
NONHDLC SERPL-MCNC: 87 MG/DL (ref ?–130)
OSMOLALITY SERPL CALC.SUM OF ELEC: 302 MOSM/KG (ref 275–295)
PLATELET # BLD AUTO: 169 10(3)UL (ref 150–450)
POTASSIUM SERPL-SCNC: 4.9 MMOL/L (ref 3.5–5.1)
PROT SERPL-MCNC: 7.1 G/DL (ref 5.7–8.2)
RBC # BLD AUTO: 4.33 X10(6)UL
SODIUM SERPL-SCNC: 141 MMOL/L (ref 136–145)
TRIGL SERPL-MCNC: 147 MG/DL (ref 30–149)
VLDLC SERPL CALC-MCNC: 22 MG/DL (ref 0–30)
WBC # BLD AUTO: 7.7 X10(3) UL (ref 4–11)

## 2024-06-18 PROCEDURE — 80053 COMPREHEN METABOLIC PANEL: CPT

## 2024-06-18 PROCEDURE — 82043 UR ALBUMIN QUANTITATIVE: CPT

## 2024-06-18 PROCEDURE — 85025 COMPLETE CBC W/AUTO DIFF WBC: CPT

## 2024-06-18 PROCEDURE — G0439 PPPS, SUBSEQ VISIT: HCPCS | Performed by: FAMILY MEDICINE

## 2024-06-18 PROCEDURE — 36415 COLL VENOUS BLD VENIPUNCTURE: CPT

## 2024-06-18 PROCEDURE — 82570 ASSAY OF URINE CREATININE: CPT

## 2024-06-18 PROCEDURE — 99213 OFFICE O/P EST LOW 20 MIN: CPT | Performed by: FAMILY MEDICINE

## 2024-06-18 PROCEDURE — 80061 LIPID PANEL: CPT

## 2024-06-18 PROCEDURE — 83036 HEMOGLOBIN GLYCOSYLATED A1C: CPT | Performed by: FAMILY MEDICINE

## 2024-06-18 NOTE — PROGRESS NOTES
Subjective:   Elliot Wei is a 79 year old male who presents for a Medicare Subsequent Annual Wellness visit (Pt already had Initial Annual Wellness) and stable chronic illnesses (addressed in visit).       History/Other:   Fall Risk Assessment:   He has been screened for Falls and is High Risk. Fall Prevention information provided to patient in After Visit Summary.    Do you feel unsteady when standing or walking?: Yes  Do you worry about falling?: Yes  Have you fallen in the past year?: No     Cognitive Assessment:   He had a completely normal cognitive assessment - see flowsheet entries     Functional Ability/Status:   Elliot Wei has some abnormal functions as listed below:  He has Vision problems based on screening of functional status. He has Walking problems based on screening of functional status.       Depression Screening (PHQ-2/PHQ-9): PHQ-9 TOTAL SCORE: 3  , done 6/18/2024   Little interest or pleasure in doing things: 3    If you checked off any problems, how difficult have these problems made it for you to do your work, take care of things at home, or get along with other people?: Not difficult at all       5 minutes spent screening and counseling for depression    Advanced Directives:   He does have a Living Will but we do NOT have it on file in Epic.    He does have a POA but we do NOT have it on file in Epic.    Not discussed      Patient Active Problem List   Diagnosis    Coronary artery disease involving native coronary artery of native heart without angina pectoris    Essential hypertension    Type 2 diabetes mellitus with stage 3 chronic kidney disease, without long-term current use of insulin, unspecified whether stage 3a or 3b CKD ([facility])     Allergies:  He is allergic to lisinopril.    Current Medications:  Outpatient Medications Marked as Taking for the 6/18/24 encounter (Office Visit) with Rebecca Connors MD   Medication Sig    Elastic Bandages & Supports (MEDICAL COMPRESSION STOCKINGS) Does  not apply Misc 1 each daily.    Elastic Bandages & Supports (MEDICAL COMPRESSION STOCKINGS) Does not apply Misc 1 each daily.    Olmesartan Medoxomil 40 MG Oral Tab Take 1 tablet (40 mg total) by mouth daily.    hydroCHLOROthiazide 12.5 MG Oral Tab Take 1 tablet (12.5 mg total) by mouth daily.    amLODIPine 5 MG Oral Tab Take 1 tablet (5 mg total) by mouth daily.    clopidogrel 75 MG Oral Tab Take 1 tablet (75 mg total) by mouth daily.    metoprolol succinate  MG Oral Tablet 24 Hr Take 1 tablet (100 mg total) by mouth daily.    metFORMIN HCl 1000 MG Oral Tab Take 1 tablet (1,000 mg total) by mouth 2 (two) times daily with meals.    atorvastatin 80 MG Oral Tab Take 1 tablet (80 mg total) by mouth nightly.    BABY ASPIRIN OR Take 81 mg by mouth.       Medical History:  He  has a past medical history of Anemia, CAD (coronary artery disease), Diabetes (HCC), Essential hypertension, Hyperlipidemia, and Pacemaker.  Surgical History:  He  has a past surgical history that includes pacemaker monitor; colonoscopy; Cardiac pacemaker placement; and colonoscopy (N/A, 12/5/2019).   Family History:  His family history includes Diabetes in his sister.  Social History:  He  reports that he has quit smoking. His smoking use included cigarettes. He has never used smokeless tobacco. He reports that he does not currently use alcohol. He reports that he does not use drugs.    Tobacco:  He smoked tobacco in the past but quit greater than 12 months ago.  Social History     Tobacco Use   Smoking Status Former    Types: Cigarettes   Smokeless Tobacco Never        CAGE Alcohol Screen:   CAGE screening score of 0 on 6/18/2024, showing low risk of alcohol abuse.      Patient Care Team:  Rebecca Connors MD as PCP - General (Family Medicine)    Review of Systems  GENERAL: feels well otherwise  SKIN: denies any unusual skin lesions  EYES: denies blurred vision or double vision  HEENT: denies nasal congestion, sinus pain or ST  LUNGS: denies  shortness of breath with exertion  CARDIOVASCULAR: denies chest pain on exertion  GI: denies abdominal pain, denies heartburn  : 1 per night nocturia, no complaint of urinary incontinence  MUSCULOSKELETAL: has right knee pain   Also has bilateral ankle swelling when here in [place] - does not walk here mostly sitting and watching TV   NEURO: denies headaches  PSYCHE: denies depression or anxiety  HEMATOLOGIC: denies hx of anemia  ENDOCRINE: denies thyroid history  ALL/ASTHMA: denies hx of allergy or asthma    Objective:   Physical Exam  General Appearance:  Alert, cooperative, no distress, appears stated age   Head:  Normocephalic, without obvious abnormality, atraumatic   Eyes:  PERRL, conjunctiva/corneas clear, EOM's intact, both eyes   Ears:  Normal TM's and external ear canals, both ears   Nose: Nares normal, septum midline, mucosa normal, no drainage or sinus tenderness   Throat: Lips, mucosa, and tongue normal; teeth and gums normal   Neck: Supple, symmetrical, trachea midline, no adenopathy, thyroid: not enlarged, symmetric, no tenderness/mass/nodules, no carotid bruit or JVD   Back:   Symmetric, no curvature, ROM normal, no CVA tenderness   Lungs:   Clear to auscultation bilaterally, respirations unlabored   Chest Wall:  No tenderness or deformity   Heart:  Regular rate and rhythm, S1, S2 normal, no murmur, rub or gallop   Abdomen:   Soft, non-tender, bowel sounds active all four quadrants,  no masses, no organomegaly   Genitalia:    Rectal:    Extremities: Tenderness iiner aspect right knee   Bilateral mild ankle swelling    Pulses: 2+ and symmetric   Skin: Skin color, texture, turgor normal, no rashes or lesions   Lymph nodes: Cervical, supraclavicular, and axillary nodes normal   Neurologic: Normal     /78   Pulse 79   Resp 18   Ht 5' 7\" (1.702 m)   Wt 149 lb (67.6 kg)   BMI 23.34 kg/m²  Estimated body mass index is 23.34 kg/m² as calculated from the following:    Height as of this encounter: 5'  7\" (1.702 m).    Weight as of this encounter: 149 lb (67.6 kg).    Medicare Hearing Assessment:   Hearing Screening    Screening Method: Finger Rub  Finger Rub Result: Pass               Assessment & Plan:   Elliot Wei is a 79 year old male who presents for a Medicare Assessment.     1. Coronary artery disease involving native coronary artery of native heart without angina pectoris (Primary)  -     Comp Metabolic Panel (14); Future; Expected date: 06/18/2024  -     Lipid Panel; Future; Expected date: 06/18/2024  -     CBC With Differential With Platelet; Future; Expected date: 06/18/2024  -     CARDIO - INTERNAL  Controlled cpm  2. Essential hypertension  Controlled cpm  3. Type 2 diabetes mellitus with stage 3 chronic kidney disease, without long-term current use of insulin, unspecified whether stage 3a or 3b CKD (HCC)  Controlled cpm  -     Diabetic Retinopathy Exam; Future; Expected date: 06/18/2024  -     Microalb/Creat Ratio, Random Urine; Future; Expected date: 06/18/2024  -     POC Glycohemoglobin [76381]  4. Knee pain, unspecified chronicity, unspecified laterality  Appears as anserine bursisti   -     Physical Therapy Referral - [facility]  Other orders  -     Medical Compression Stockings; 1 each daily.  Dispense: 2 each; Refill: 0  -     Medical Compression Stockings; 1 each daily.  Dispense: 2 each; Refill: 0  Edema- start stockings   Elevate feet   The patient indicates understanding of these issues and agrees to the plan.  Reinforced healthy diet, lifestyle, and exercise.      No follow-ups on file.     Rebecca Connors MD, 6/18/2024     Supplementary Documentation:   General Health:  In the past six months, have you lost more than 10 pounds without trying?: 2 - No  Has your appetite been poor?: No  Type of Diet: Balanced  How does the patient maintain a good energy level?: Other  How would you describe your daily physical activity?: None  How would you describe your current health state?:  Good  How do you maintain positive mental well-being?: Visiting Family  On a scale of 0 to 10, with 0 being no pain and 10 being severe pain, what is your pain level?: 2 - (Mild)  In the past six months, have you experienced urine leakage?: 1-Yes  At any time do you feel concerned for the safety/well-being of yourself and/or your children, in your home or elsewhere?: No  Have you had any immunizations at another office such as Influenza, Hepatitis B, Tetanus, or Pneumococcal?: Mya Wei's SCREENING SCHEDULE   Tests on this list are recommended by your physician but may not be covered, or covered at this frequency, by your insurer.   Please check with your insurance carrier before scheduling to verify coverage.   PREVENTATIVE SERVICES FREQUENCY &  COVERAGE DETAILS LAST COMPLETION DATE   Diabetes Screening    Fasting Blood Sugar / Glucose    One screening every 12 months if never tested or if previously tested but not diagnosed with pre-diabetes   One screening every 6 months if diagnosed with pre-diabetes Lab Results   Component Value Date     (H) 03/30/2023        Cardiovascular Disease Screening    Lipid Panel  Cholesterol  Lipoprotein (HDL)  Triglycerides Covered every 5 years for all Medicare beneficiaries without apparent signs or symptoms of cardiovascular disease Lab Results   Component Value Date    CHOLEST 109 03/30/2023    HDL 48 03/30/2023    LDL 38 03/30/2023    TRIG 130 03/30/2023         Electrocardiogram (EKG)   Covered if needed at Welcome to Medicare, and non-screening if indicated for medical reasons 12/10/2018      Ultrasound Screening for Abdominal Aortic Aneurysm (AAA) Covered once in a lifetime for one of the following risk factors    Men who are 65-75 years old and have ever smoked    Anyone with a family history -     Colorectal Cancer Screening  Covered for ages 50-85; only need ONE of the following:    Colonoscopy   Covered every 10 years    Covered every 2 years if  patient is at high risk or previous colonoscopy was abnormal 12/05/2019    No recommendations at this time    Flexible Sigmoidoscopy   Covered every 4 years -    Fecal Occult Blood Test Covered annually -   Prostate Cancer Screening    Prostate-Specific Antigen (PSA) Annually No results found for: \"PSA\"  There are no preventive care reminders to display for this patient.   Immunizations    Influenza Covered once per flu season  Please get every year 10/16/2023  No recommendations at this time    Pneumococcal Each vaccine ([id number] & [id number]) covered once after 65 Prevnar 13: 11/19/2019    [id number]: 11/13/2018     No recommendations at this time    Hepatitis B One screening covered for patients with certain risk factors   -  No recommendations at this time    Tetanus Toxoid Not covered by Medicare Part B unless medically necessary (cut with metal); may be covered with your pharmacy prescription benefits -    Tetanus, Diptheria and Pertusis TD and TDaP Not covered by Medicare Part B -  No recommendations at this time    Zoster Not covered by Medicare Part B; may be covered with your pharmacy  prescription benefits -  Zoster Vaccines(1 of 2) Never done     Diabetes      Hemoglobin A1C Annually; if last result is elevated, may be asked to retest more frequently.    Medicare covers every 3 months Lab Results   Component Value Date     (H) 06/18/2021    A1C 6.5 (A) 06/18/2024       No recommendations at this time    Creat/alb ratio Annually Lab Results   Component Value Date    MICROALBCREA 46.5 (H) 04/18/2023       LDL Annually Lab Results   Component Value Date    LDL 38 03/30/2023       Dilated Eye Exam Annually Last Diabetic Eye Exam:  No data recorded  No data recorded       Annual Monitoring of Persistent Medications (ACE/ARB, digoxin diuretics, anticonvulsants)    Potassium Annually Lab Results   Component Value Date    K 4.9 03/30/2023         Creatinine   Annually Lab Results   Component Value Date     CREATSERUM 2.11 (H) 03/30/2023         BUN Annually Lab Results   Component Value Date    BUN 35 (H) 03/30/2023       Drug Serum Conc Annually No results found for: \"DIGOXIN\", \"DIG\", \"VALP\"

## 2024-06-18 NOTE — PATIENT INSTRUCTIONS
Elliot Wei's SCREENING SCHEDULE   Tests on this list are recommended by your physician but may not be covered, or covered at this frequency, by your insurer.   Please check with your insurance carrier before scheduling to verify coverage.   PREVENTATIVE SERVICES FREQUENCY &  COVERAGE DETAILS LAST COMPLETION DATE   Diabetes Screening    Fasting Blood Sugar / Glucose    One screening every 12 months if never tested or if previously tested but not diagnosed with pre-diabetes   One screening every 6 months if diagnosed with pre-diabetes Lab Results   Component Value Date     (H) 03/30/2023        Cardiovascular Disease Screening    Lipid Panel  Cholesterol  Lipoprotein (HDL)  Triglycerides Covered every 5 years for all Medicare beneficiaries without apparent signs or symptoms of cardiovascular disease Lab Results   Component Value Date    CHOLEST 109 03/30/2023    HDL 48 03/30/2023    LDL 38 03/30/2023    TRIG 130 03/30/2023         Electrocardiogram (EKG)   Covered if needed at Welcome to Medicare, and non-screening if indicated for medical reasons 12/10/2018      Ultrasound Screening for Abdominal Aortic Aneurysm (AAA) Covered once in a lifetime for one of the following risk factors   • Men who are 65-75 years old and have ever smoked   • Anyone with a family history -     Colorectal Cancer Screening  Covered for ages 50-85; only need ONE of the following:    Colonoscopy   Covered every 10 years    Covered every 2 years if patient is at high risk or previous colonoscopy was abnormal 12/05/2019    No recommendations at this time    Flexible Sigmoidoscopy   Covered every 4 years -    Fecal Occult Blood Test Covered annually -   Prostate Cancer Screening    Prostate-Specific Antigen (PSA) Annually No results found for: \"PSA\"  There are no preventive care reminders to display for this patient.   Immunizations    Influenza Covered once per flu season  Please get every year 10/16/2023  No recommendations at this  time    Pneumococcal Each vaccine ([id number] & [id number]) covered once after 65 Prevnar 13: 11/19/2019    [id number]: 11/13/2018     No recommendations at this time    Hepatitis B One screening covered for patients with certain risk factors   -  No recommendations at this time    Tetanus Toxoid Not covered by Medicare Part B unless medically necessary (cut with metal); may be covered with your pharmacy prescription benefits -    Tetanus, Diptheria and Pertusis TD and TDaP Not covered by Medicare Part B -  No recommendations at this time    Zoster Not covered by Medicare Part B; may be covered with your pharmacy  prescription benefits -  Zoster Vaccines(1 of 2) Never done     Diabetes      Hemoglobin A1C Annually; if last result is elevated, may be asked to retest more frequently.    Medicare covers every 3 months Lab Results   Component Value Date     (H) 06/18/2021    A1C 6.5 (A) 06/18/2024       No recommendations at this time    Creat/alb ratio Annually Lab Results   Component Value Date    MICROALBCREA 46.5 (H) 04/18/2023       LDL Annually Lab Results   Component Value Date    LDL 38 03/30/2023       Dilated Eye Exam Annually Last Diabetic Eye Exam:  No data recorded  No data recorded       Annual Monitoring of Persistent Medications (ACE/ARB, digoxin diuretics, anticonvulsants)    Potassium Annually Lab Results   Component Value Date    K 4.9 03/30/2023         Creatinine   Annually Lab Results   Component Value Date    CREATSERUM 2.11 (H) 03/30/2023         BUN Annually Lab Results   Component Value Date    BUN 35 (H) 03/30/2023       Drug Serum Conc Annually No results found for: \"DIGOXIN\", \"DIG\", \"VALP\"

## 2024-06-27 ENCOUNTER — NURSE ONLY (OUTPATIENT)
Dept: INTERNAL MEDICINE CLINIC | Facility: CLINIC | Age: 79
End: 2024-06-27

## 2024-06-27 DIAGNOSIS — E11.22 TYPE 2 DIABETES MELLITUS WITH STAGE 3 CHRONIC KIDNEY DISEASE, WITHOUT LONG-TERM CURRENT USE OF INSULIN, UNSPECIFIED WHETHER STAGE 3A OR 3B CKD (HCC): ICD-10-CM

## 2024-06-27 DIAGNOSIS — N18.30 TYPE 2 DIABETES MELLITUS WITH STAGE 3 CHRONIC KIDNEY DISEASE, WITHOUT LONG-TERM CURRENT USE OF INSULIN, UNSPECIFIED WHETHER STAGE 3A OR 3B CKD (HCC): ICD-10-CM

## 2024-06-27 PROCEDURE — 92229 IMG RTA DETC/MNTR DS POC ALY: CPT | Performed by: FAMILY MEDICINE

## 2024-07-05 RX ORDER — OLMESARTAN MEDOXOMIL 40 MG/1
40 TABLET ORAL DAILY
Qty: 90 TABLET | Refills: 3 | Status: SHIPPED | OUTPATIENT
Start: 2024-07-05

## 2024-07-05 RX ORDER — HYDROCHLOROTHIAZIDE 12.5 MG/1
12.5 TABLET ORAL DAILY
Qty: 90 TABLET | Refills: 3 | Status: SHIPPED | OUTPATIENT
Start: 2024-07-05

## 2024-07-05 RX ORDER — METOPROLOL SUCCINATE 100 MG/1
100 TABLET, EXTENDED RELEASE ORAL DAILY
Qty: 90 TABLET | Refills: 3 | Status: SHIPPED | OUTPATIENT
Start: 2024-07-05

## 2024-07-05 RX ORDER — AMLODIPINE BESYLATE 5 MG/1
5 TABLET ORAL DAILY
Qty: 90 TABLET | Refills: 3 | Status: SHIPPED | OUTPATIENT
Start: 2024-07-05

## 2024-07-05 RX ORDER — CLOPIDOGREL BISULFATE 75 MG/1
75 TABLET ORAL DAILY
Qty: 90 TABLET | Refills: 3 | Status: SHIPPED | OUTPATIENT
Start: 2024-07-05

## 2024-07-05 NOTE — TELEPHONE ENCOUNTER
Please review; protocol failed/No Protocol  Requested Prescriptions   Pending Prescriptions Disp Refills    AMLODIPINE 5 MG Oral Tab [Pharmacy Med Name: amLODIPine Besylate Oral Tablet 5 MG] 90 tablet 0     Sig: TAKE 1 TABLET BY MOUTH DAILY       Hypertension Medications Protocol Failed - 7/1/2024  2:41 PM        Failed - EGFRCR or GFRNAA > 50     GFR Evaluation  EGFRCR: 41 , resulted on 6/18/2024          Passed - CMP or BMP in past 12 months        Passed - Last BP reading less than 140/90     BP Readings from Last 1 Encounters:   06/18/24 110/70               Passed - In person appointment or virtual visit in the past 12 mos or appointment in next 3 mos     Recent Outpatient Visits              1 week ago Type 2 diabetes mellitus with stage 3 chronic kidney disease, without long-term current use of insulin, unspecified whether stage 3a or 3b CKD ([facility])    [facility]    2 weeks ago Coronary artery disease involving native coronary artery of native heart without angina pectoris    [facility]    [facility]    1 year ago Renal insufficiency    [facility]    Office Visit    2 years ago Renal insufficiency    [facility]    Office Visit    2 years ago Reactive depression    [facility]    [facility]                        HYDROCHLOROTHIAZIDE 12.5 MG Oral Tab [Pharmacy Med Name: hydroCHLOROthiazide Oral Tablet 12.5 MG] 90 tablet 0     Sig: TAKE 1 TABLET BY MOUTH DAILY       Hypertension Medications Protocol Failed - 7/1/2024  2:41 PM        Failed - EGFRCR or GFRNAA > 50     GFR Evaluation  EGFRCR: 41 , resulted on 6/18/2024          Passed - CMP or BMP in past 12 months        Passed - Last BP reading less  than 140/90     BP Readings from Last 1 Encounters:   06/18/24 110/70               Passed - In person appointment or virtual visit in the past 12 mos or appointment in next 3 mos     Recent Outpatient Visits              1 week ago Type 2 diabetes mellitus with stage 3 chronic kidney disease, without long-term current use of insulin, unspecified whether stage 3a or 3b CKD ([facility])    [facility]    Nurse Only    2 weeks ago Coronary artery disease involving native coronary artery of native heart without angina pectoris    [facility]    [facility]    1 year ago [facility]    [facility]    2 years ago [facility]al [facility]    [facility]    2 years ago [facility]active depression    [facility]    [facility]                        OLMESARTAN MEDOXOMIL 40 MG Oral Tab [Pharmacy Med Name: Olmesartan Medoxomil Oral Tablet 40 MG] 90 tablet 0     Sig: TAKE 1 TABLET BY MOUTH DAILY       Hypertension Medications Protocol Failed - 7/1/2024  2:41 PM        Failed - EGFRCR or GFRNAA > 50     GFR Evaluation  EGFRCR: 41 , resulted on 6/18/2024          Passed - CMP or BMP in past 12 months        Passed - Last BP reading less than 140/90     BP Readings from Last 1 Encounters:   06/18/24 110/70               Passed - In person appointment or virtual visit in the past 12 mos or appointment in next 3 mos     Recent Outpatient Visits              1 week ago Type 2 diabetes mellitus with stage 3 chronic kidney disease, without long-term current use of insulin, unspecified whether stage 3a or 3b CKD ([facility])    [facility]    Nurse Only    2 weeks ago Coronary artery  disease involving native coronary artery of native heart without angina pectoris    [facility]    [facility]    1 year ago [facility]al [facility]    [facility]    2 years ago [facility]al insufficiency    [facility]    [facility]    2 years ago Reactive depression    [facility]    [facility]                        CLOPIDOGREL 75 MG Oral Tab [Pharmacy Med Name: Clopidogrel Bisulfate Oral Tablet 75 MG] 90 tablet 0     Sig: TAKE 1 TABLET BY MOUTH DAILY       There is no refill protocol information for this order       METFORMIN HCL 1000 MG Oral Tab [Pharmacy Med Name: metFORMIN HCl Oral Tablet 1000 MG] 180 tablet 0     Sig: TAKE ONE TABLET BY MOUTH TWICE DAILY WITH FOOD       Diabetes Medication Protocol Failed - 7/1/2024  2:41 PM        Failed - EGFRCR or GFRNAA > 50     GFR Evaluation  EGFRCR: 41 , resulted on 6/18/2024          Passed - Last A1C < 7.5 and within past 6 months     Lab Results   Component Value Date    A1C 6.5 (A) 06/18/2024             Passed - In person appointment or virtual visit in the past 6 mos or appointment in next 3 mos     Recent Outpatient Visits              1 week ago Type 2 diabetes mellitus with stage 3 chronic kidney disease, without long-term current use of insulin, unspecified whether stage 3a or 3b CKD ([facility])    [facility]    2 weeks ago Coronary artery disease involving native coronary artery of native heart without angina pectoris    [facility]    Office Visit    1 year ago Renal insufficiency    [facility]    Office Visit    2 years  ago Renal insufficiency    [facility]    [facility]    2 years ago [facility]active depression    [facility]    [facility]                      Passed - Microalbumin procedure in past 12 months or taking ACE/ARB        Passed - GFR in the past 12 months          METOPROLOL SUCCINATE  MG Oral Tablet 24 Hr [Pharmacy Med Name: Metoprolol Succinate ER Oral Tablet Extended Release 24 Hour 100 MG] 90 tablet 0     Sig: TAKE 1 TABLET BY MOUTH DAILY       Hypertension Medications Protocol Failed - 7/1/2024  2:41 PM        Failed - EGFRCR or GFRNAA > 50     GFR Evaluation  EGFRCR: 41 , resulted on 6/18/2024          Passed - CMP or BMP in past 12 months        Passed - Last BP reading less than 140/90     BP Readings from Last 1 Encounters:   06/18/24 110/70               Passed - In person appointment or virtual visit in the past 12 mos or appointment in next 3 mos     Recent Outpatient Visits              1 week ago Type 2 diabetes mellitus with stage 3 chronic kidney disease, without long-term current use of insulin, unspecified whether stage 3a or 3b CKD ([facility])    [facility]    2 weeks ago Coronary artery disease involving native coronary artery of native heart without angina pectoris    [facility]    [facility] Visit    1 year ago Renal insufficiency    [facility]    [facility] Visit    2 years ago Renal insufficiency    [facility]    Office Visit    2 years ago Reactive depression    [facility]    [facility]                           Recent Outpatient Visits              1 week ago Type 2  diabetes mellitus with stage 3 chronic kidney disease, without long-term current use of insulin, unspecified whether stage 3a or 3b CKD ([facility]    2 weeks ago Coronary artery disease involving native coronary artery of native heart without angina pectoris    [facility]    [facility]    1 year [facility]    [facility]    2 years ago [facility]al [facility]    Office Visit    2 years ago Reactive depression    [facility]    [facility] Visit

## 2024-08-07 ENCOUNTER — OFFICE VISIT (OUTPATIENT)
Dept: PHYSICAL THERAPY | Facility: HOSPITAL | Age: 79
End: 2024-08-07
Attending: FAMILY MEDICINE
Payer: MEDICARE

## 2024-08-07 ENCOUNTER — TELEPHONE (OUTPATIENT)
Dept: FAMILY MEDICINE CLINIC | Facility: CLINIC | Age: 79
End: 2024-08-07

## 2024-08-07 DIAGNOSIS — M25.569 KNEE PAIN, UNSPECIFIED CHRONICITY, UNSPECIFIED LATERALITY: Primary | ICD-10-CM

## 2024-08-07 PROCEDURE — 97110 THERAPEUTIC EXERCISES: CPT

## 2024-08-07 PROCEDURE — 97161 PT EVAL LOW COMPLEX 20 MIN: CPT

## 2024-08-07 NOTE — PROGRESS NOTES
LOWER EXTREMITY EVALUATION:     Diagnosis:   Knee pain, unspecified chronicity, unspecified laterality (M25.569)         Referring Provider: Rebecca Connors  Date of Evaluation:    8/7/2024    Precautions:  Fall risk, CAD, DM2, Syriac Next MD visit:   none scheduled  Date of Surgery: n/a     PATIENT SUMMARY   Elliot Wei is a 79 year old male who presents to therapy today with complaints of B knee pain (R>L).  Reports he has pain and tingling in his lower legs B, \"like they get tired right away\"; denies tingling in feet.    Pt describes pain level current 5/10 (with movement)  Current functional limitations include stairs, walking (also pain in lower legs),     Elliot describes prior level of function indep. Pt goals include decrease pain.  Past medical history was reviewed with Elliot. Significant findings include   Past Medical History:    Anemia    CAD (coronary artery disease)    Diabetes (HCC)    Essential hypertension    Hyperlipidemia    Pacemaker     Past Surgical History:   Procedure Laterality Date    Cardiac pacemaker placement      Colonoscopy      6 or 7 yrs ago at [facility]    Colonoscopy N/A 12/5/2019    Procedure: COLONOSCOPY;  Surgeon: Dayne Langford MD;  Location: [facility] ENDOSCOPY    Pacemaker monitor           ASSESSMENT  Elliot presents to physical therapy evaluation with primary c/o B knee pain (R>L). The results of the objective tests and measures show decreased ROM, decreased strength, decreased balance, decreased joint mobility.  Functional deficits include but are not limited to walking, prolonged sitting, stairs.  Signs and symptoms are consistent with referring diagnosis, knee OA. Pt and PT discussed evaluation findings, pathology, POC and HEP.  Pt voiced understanding and performs HEP correctly without reported pain. Skilled Physical Therapy is medically necessary to address the above impairments and reach functional goals.     OBJECTIVE:   Observation: flexed posture  walking  Palpation: TTP medial joint line R knee, R prox tib/fem  Sensation: intact light touch BLE   Pt reports intermittent numbness in feet; had vitamin injection in [place] and numbness resolved    AROM (PROM): (* denotes performed with pain)  Hip Knee   Flexion: R WFL; L WFL  Extension: R 0; L 0   Flexion: R 130; L 145  Extension: R 0; L 0        Accessory motion: hypomobile R patella with crepitus; hypomobile R tib/fem AP    Flexibility:  Hip Flexor: R mod, L mod  Hamstrings: R min; L min  Quads: R min; L mod    Strength/MMT: (* denotes performed with pain)  Hip Knee   Flexion: R 4/5; L 4/5  Extension: R 4-/5; L 4-/5  Abduction: R 4-/5; L 4-/5   Flexion: R 4+/5; L 4+/5  Extension: R 4+/5; L 4+/5        SLR: extensor lag L>R  Squat: increased weight onto RLE with slight L trunk rotation    Special tests:   Varus Stress Test: R -, L -  Valgus Stress Test: R -, L -  Lachman Test: R -, L -  Posterior Drawer Test: R -, L -  Apley Compression: R -, L -  Patellar Apprehension: R +, L -    Fair eccentric control with stair descent, needs UE support  Mild valgus with SL squat R, needs UE support       Gait: pt ambulates on level ground with antalgia and forward flexed posture      Today’s Treatment and Response:   Pt education was provided on exam findings, treatment diagnosis, treatment plan, expectations, and prognosis. Pt was also provided recommendations for modalities as needed [ice/heat], ergonomics, and importance of remaining active.  Patient was instructed in and issued a HEP for: quad stretch (supine over EOB), SLR flex, clamshells (GTB)    Charges: PT Eval Low Complexity, 1TE      Total Timed Treatment: 10 min     Total Treatment Time: 40 min     PLAN OF CARE:    Goals: (to be met in 8 visits)  Pt will improve knee extension ROM to 0 deg to allow proper heel strike during gait and terminal knee extension in stance   Pt will navigate stairs without limitation due to pain  Pt will improve quad strength to 5/5  to ascend 1 flight of stairs reciprocally without UE assist   Pt will increase hip and knee strength to grossly 4+/5 to be able t improve stair navigation  Pt will be independent and compliant with comprehensive HEP to maintain progress achieved in PT       Frequency / Duration: Patient will be seen for 1-2 x/week or a total of 6-8 visits over a 90 day period. Treatment will include: Gait training, Mechanical Traction, Therapeutic Activities, Therapeutic Exercise, and Home Exercise Program instruction    Education or treatment limitation: None  Rehab Potential:good    LEFS Score  LEFS Score: 53.75 % (8/7/2024 10:59 AM)    Patient/Family/Caregiver was advised of these findings, precautions, and treatment options and has agreed to actively participate in planning and for this course of care.    Thank you for your referral. Please co-sign or sign and return this letter via fax as soon as possible to [phone]. If you have any questions, please contact me at Dept: [phone]    Sincerely,  Electronically signed by therapist: Heather Brunner, PT  Physician's certification required: Yes  I certify the need for these services furnished under this plan of treatment and while under my care.    X___________________________________________________ Date____________________    Certification From: 8/7/2024  To:11/5/2024

## 2024-08-07 NOTE — TELEPHONE ENCOUNTER
Patient left form for doctor to fill out/left in her box/call when done  Then fax to number on form

## 2024-08-08 ENCOUNTER — MED REC SCAN ONLY (OUTPATIENT)
Dept: FAMILY MEDICINE CLINIC | Facility: CLINIC | Age: 79
End: 2024-08-08

## 2024-08-08 NOTE — TELEPHONE ENCOUNTER
Faxed as requested to [employer] @ [phone]. Tried to call pt, call disconnected. Unsure if pt wanted to  a copy of form, but will leave a copy at  and send one to scan to chart

## 2024-08-12 ENCOUNTER — APPOINTMENT (OUTPATIENT)
Dept: PHYSICAL THERAPY | Facility: HOSPITAL | Age: 79
End: 2024-08-12
Attending: FAMILY MEDICINE
Payer: MEDICARE

## 2024-08-13 ENCOUNTER — APPOINTMENT (OUTPATIENT)
Dept: PHYSICAL THERAPY | Facility: HOSPITAL | Age: 79
End: 2024-08-13
Attending: FAMILY MEDICINE
Payer: MEDICARE

## 2024-08-19 ENCOUNTER — APPOINTMENT (OUTPATIENT)
Dept: PHYSICAL THERAPY | Facility: HOSPITAL | Age: 79
End: 2024-08-19
Attending: FAMILY MEDICINE
Payer: MEDICARE

## 2024-08-21 ENCOUNTER — APPOINTMENT (OUTPATIENT)
Dept: PHYSICAL THERAPY | Facility: HOSPITAL | Age: 79
End: 2024-08-21
Attending: FAMILY MEDICINE
Payer: MEDICARE

## 2024-08-26 ENCOUNTER — APPOINTMENT (OUTPATIENT)
Dept: PHYSICAL THERAPY | Facility: HOSPITAL | Age: 79
End: 2024-08-26
Attending: FAMILY MEDICINE
Payer: MEDICARE

## 2024-08-28 ENCOUNTER — APPOINTMENT (OUTPATIENT)
Dept: PHYSICAL THERAPY | Facility: HOSPITAL | Age: 79
End: 2024-08-28
Attending: FAMILY MEDICINE
Payer: MEDICARE

## (undated) DEVICE — 35 ML SYRINGE REGULAR TIP: Brand: MONOJECT

## (undated) DEVICE — MEDI-VAC NON-CONDUCTIVE SUCTION TUBING 6MM X 1.8M (6FT.) L: Brand: CARDINAL HEALTH

## (undated) DEVICE — FORCEP RADIAL JAW 4

## (undated) DEVICE — Device: Brand: DEFENDO AIR/WATER/SUCTION AND BIOPSY VALVE

## (undated) DEVICE — Device: Brand: CUSTOM PROCEDURE KIT

## (undated) DEVICE — CONMED SCOPE SAVER BITE BLOCK, 20X27 MM: Brand: SCOPE SAVER

## (undated) DEVICE — NEEDLE CONTRAST INTERJECT 25G

## (undated) DEVICE — LINE MNTR ADLT SET O2 INTMD

## (undated) DEVICE — Device: Brand: SPOT EX ENDOSCOPIC TATTOO

## (undated) NOTE — LETTER
4/7/2020              Nicolas Heredia        [address]         Dear Jane Briceno,    This letter is to inform you that our office has made several attempts to reach you on your cell phone, but the mail box is full.  We also attem

## (undated) NOTE — LETTER
2020    Nicolas Heredia  [address]   [place] [address]  VIA Mail    Re: Nicolas Heredia  :       Dear Dr. Tony Hooker :    I write to you in regards to my patient, Nicolas Heredia, who is scheduled to undergo surgery in the near future